# Patient Record
Sex: MALE | NOT HISPANIC OR LATINO | Employment: OTHER | URBAN - NONMETROPOLITAN AREA
[De-identification: names, ages, dates, MRNs, and addresses within clinical notes are randomized per-mention and may not be internally consistent; named-entity substitution may affect disease eponyms.]

---

## 2023-04-20 PROBLEM — M19.079 1ST MTP ARTHRITIS: Status: ACTIVE | Noted: 2023-04-20

## 2023-04-20 PROBLEM — E78.00 LOW-DENSITY-LIPOID-TYPE (LDL) HYPERLIPOPROTEINEMIA: Status: ACTIVE | Noted: 2023-04-20

## 2023-04-20 PROBLEM — G31.84 MCI (MILD COGNITIVE IMPAIRMENT): Status: ACTIVE | Noted: 2023-04-20

## 2023-04-20 PROBLEM — E66.9 OBESITY: Status: ACTIVE | Noted: 2023-04-20

## 2023-04-20 PROBLEM — M19.90 ARTHRITIS: Status: ACTIVE | Noted: 2023-04-20

## 2023-04-20 PROBLEM — N52.9 ERECTILE DYSFUNCTION: Status: ACTIVE | Noted: 2023-04-20

## 2023-04-20 PROBLEM — I10 HYPERTENSION: Status: ACTIVE | Noted: 2023-04-20

## 2023-04-20 PROBLEM — K42.9 UMBILICAL HERNIA: Status: ACTIVE | Noted: 2023-04-20

## 2023-04-20 PROBLEM — K58.9 IRRITABLE BOWEL SYNDROME: Status: ACTIVE | Noted: 2023-04-20

## 2023-04-20 PROBLEM — E78.3 HYPERCHYLOMICRONEMIA: Status: ACTIVE | Noted: 2023-04-20

## 2023-04-20 PROBLEM — E03.9 HYPOTHYROIDISM, ADULT: Status: ACTIVE | Noted: 2023-04-20

## 2023-04-20 PROBLEM — E79.0 HYPERURICEMIA: Status: ACTIVE | Noted: 2023-04-20

## 2023-04-20 PROBLEM — G47.33 OBSTRUCTIVE SLEEP APNEA, ADULT: Status: ACTIVE | Noted: 2023-04-20

## 2023-04-20 PROBLEM — E55.9 VITAMIN D DEFICIENCY: Status: ACTIVE | Noted: 2023-04-20

## 2023-04-20 RX ORDER — CLOBETASOL PROPIONATE 0.5 MG/G
CREAM TOPICAL 2 TIMES DAILY
COMMUNITY
Start: 2022-07-18 | End: 2023-11-16 | Stop reason: ALTCHOICE

## 2023-04-20 RX ORDER — LEVOTHYROXINE SODIUM 50 UG/1
50 TABLET ORAL DAILY
COMMUNITY
End: 2023-06-01 | Stop reason: SDUPTHER

## 2023-04-20 RX ORDER — SIMVASTATIN 20 MG/1
20 TABLET, FILM COATED ORAL EVERY EVENING
COMMUNITY
End: 2023-05-26 | Stop reason: SDUPTHER

## 2023-04-20 RX ORDER — ASPIRIN 81 MG/1
81 TABLET ORAL ONCE
COMMUNITY
Start: 2019-05-29

## 2023-04-20 RX ORDER — AMLODIPINE BESYLATE 10 MG/1
10 TABLET ORAL DAILY
COMMUNITY
End: 2023-06-01 | Stop reason: SDUPTHER

## 2023-04-20 RX ORDER — SILDENAFIL 100 MG/1
100 TABLET, FILM COATED ORAL AS NEEDED
COMMUNITY
End: 2023-10-24 | Stop reason: SDUPTHER

## 2023-04-24 ENCOUNTER — OFFICE VISIT (OUTPATIENT)
Dept: PRIMARY CARE | Facility: CLINIC | Age: 78
End: 2023-04-24
Payer: MEDICARE

## 2023-04-24 VITALS
HEART RATE: 58 BPM | HEIGHT: 68 IN | SYSTOLIC BLOOD PRESSURE: 146 MMHG | TEMPERATURE: 97.4 F | DIASTOLIC BLOOD PRESSURE: 68 MMHG | BODY MASS INDEX: 28.7 KG/M2 | WEIGHT: 189.4 LBS | OXYGEN SATURATION: 98 %

## 2023-04-24 DIAGNOSIS — Z79.899 HIGH RISK MEDICATION USE: ICD-10-CM

## 2023-04-24 DIAGNOSIS — E79.0 HYPERURICEMIA: ICD-10-CM

## 2023-04-24 DIAGNOSIS — Z13.89 SCREENING FOR MULTIPLE CONDITIONS: ICD-10-CM

## 2023-04-24 DIAGNOSIS — N18.31 STAGE 3A CHRONIC KIDNEY DISEASE (MULTI): ICD-10-CM

## 2023-04-24 DIAGNOSIS — Z00.00 ROUTINE GENERAL MEDICAL EXAMINATION AT HEALTH CARE FACILITY: ICD-10-CM

## 2023-04-24 DIAGNOSIS — I10 HYPERTENSION, UNSPECIFIED TYPE: ICD-10-CM

## 2023-04-24 DIAGNOSIS — M19.90 ARTHRITIS: ICD-10-CM

## 2023-04-24 DIAGNOSIS — Z13.6 SCREENING FOR CARDIOVASCULAR CONDITION: ICD-10-CM

## 2023-04-24 DIAGNOSIS — R53.83 OTHER FATIGUE: ICD-10-CM

## 2023-04-24 DIAGNOSIS — E78.3 HYPERCHYLOMICRONEMIA: ICD-10-CM

## 2023-04-24 DIAGNOSIS — E03.9 HYPOTHYROIDISM, ADULT: Primary | ICD-10-CM

## 2023-04-24 DIAGNOSIS — N52.9 ERECTILE DYSFUNCTION, UNSPECIFIED ERECTILE DYSFUNCTION TYPE: ICD-10-CM

## 2023-04-24 PROBLEM — E66.9 OBESITY: Status: RESOLVED | Noted: 2023-04-20 | Resolved: 2023-04-24

## 2023-04-24 PROBLEM — K42.9 UMBILICAL HERNIA: Status: RESOLVED | Noted: 2023-04-20 | Resolved: 2023-04-24

## 2023-04-24 PROBLEM — K58.9 IRRITABLE BOWEL SYNDROME: Status: RESOLVED | Noted: 2023-04-20 | Resolved: 2023-04-24

## 2023-04-24 PROCEDURE — 3078F DIAST BP <80 MM HG: CPT | Performed by: INTERNAL MEDICINE

## 2023-04-24 PROCEDURE — 3077F SYST BP >= 140 MM HG: CPT | Performed by: INTERNAL MEDICINE

## 2023-04-24 PROCEDURE — 1170F FXNL STATUS ASSESSED: CPT | Performed by: INTERNAL MEDICINE

## 2023-04-24 PROCEDURE — 1159F MED LIST DOCD IN RCRD: CPT | Performed by: INTERNAL MEDICINE

## 2023-04-24 PROCEDURE — 1160F RVW MEDS BY RX/DR IN RCRD: CPT | Performed by: INTERNAL MEDICINE

## 2023-04-24 PROCEDURE — 99214 OFFICE O/P EST MOD 30 MIN: CPT | Performed by: INTERNAL MEDICINE

## 2023-04-24 PROCEDURE — G0444 DEPRESSION SCREEN ANNUAL: HCPCS | Performed by: INTERNAL MEDICINE

## 2023-04-24 PROCEDURE — G0446 INTENS BEHAVE THER CARDIO DX: HCPCS | Performed by: INTERNAL MEDICINE

## 2023-04-24 PROCEDURE — 1036F TOBACCO NON-USER: CPT | Performed by: INTERNAL MEDICINE

## 2023-04-24 PROCEDURE — G0439 PPPS, SUBSEQ VISIT: HCPCS | Performed by: INTERNAL MEDICINE

## 2023-04-24 ASSESSMENT — ENCOUNTER SYMPTOMS
DEPRESSION: 0
DIARRHEA: 0
PALPITATIONS: 0
SINUS PAIN: 0
WHEEZING: 0
FATIGUE: 0
LOSS OF SENSATION IN FEET: 0
ABDOMINAL PAIN: 0
FEVER: 0
HYPERTENSION: 1
BRUISES/BLEEDS EASILY: 0
OCCASIONAL FEELINGS OF UNSTEADINESS: 0
UNEXPECTED WEIGHT CHANGE: 0
DIZZINESS: 0
ARTHRALGIAS: 0
BLOOD IN STOOL: 0
COUGH: 0
SORE THROAT: 0
HEADACHES: 0
DIFFICULTY URINATING: 0

## 2023-04-24 ASSESSMENT — ACTIVITIES OF DAILY LIVING (ADL)
TAKING_MEDICATION: INDEPENDENT
MANAGING_FINANCES: INDEPENDENT
GROCERY_SHOPPING: INDEPENDENT
DOING_HOUSEWORK: INDEPENDENT
BATHING: INDEPENDENT
DRESSING: INDEPENDENT

## 2023-04-24 ASSESSMENT — PATIENT HEALTH QUESTIONNAIRE - PHQ9
SUM OF ALL RESPONSES TO PHQ9 QUESTIONS 1 AND 2: 0
1. LITTLE INTEREST OR PLEASURE IN DOING THINGS: NOT AT ALL
2. FEELING DOWN, DEPRESSED OR HOPELESS: NOT AT ALL

## 2023-04-24 NOTE — PROGRESS NOTES
Subjective   Reason for Visit: Lonny Reece is an 77 y.o. male here for a Medicare Wellness visit.          Reviewed all medications by prescribing practitioner or clinical pharmacist (such as prescriptions, OTCs, herbal therapies and supplements) and documented in the medical record.    - Screening for depression  I spent 15 minutes obtaining and discussing depression screening using PHQ-2 questions with results documented in the chart.  -Need to complete blood work in May 2023 new order placed today  - Status post abdominal hernia repair doing well counseled about weight loss exercise  - Patient counseled about avoiding apple cider vinegar risk for osteoporosis and bleeding ulcer  - Screening for cardiovascular risk  I spent 15 minutes face-to-face with this individual discussing their cardiovascular risk and behavioral therapies of nutritional choices, exercise, and elimination of habits contributing to risk. We agreed on plan how they may be able to reduce their current cardiovascular risk.  Patient 10 year cardiac risk estimate calculates :   33.8    %   Counseled about low-carb diet for cardiovascular risk prevention maximize medical management  -Hypothyroid controlled  - Erectile dysfunction controlled continue with current medication  - Counseled about Shingrix vaccine new prescription provided today  - Chronic kidney disease avoid any NSAID maximize medical management reevaluate after blood work in 6 months      Vitamin Deficiency  Hypertension  Pertinent negatives include no chest pain, headaches or palpitations.       Patient Care Team:  Leann Dunaway MD as PCP - General  Leann Dunaway MD as PCP - [facility] ACO Attributed Provider     Review of Systems   Constitutional:  Negative for fatigue, fever and unexpected weight change.   HENT:  Negative for congestion, ear discharge, ear pain, mouth sores, sinus pain and sore throat.    Eyes:  Negative for visual disturbance.   Respiratory:  Negative for  "cough and wheezing.    Cardiovascular:  Negative for chest pain, palpitations and leg swelling.   Gastrointestinal:  Negative for abdominal pain, blood in stool and diarrhea.   Genitourinary:  Negative for difficulty urinating.   Musculoskeletal:  Negative for arthralgias.   Skin:  Negative for rash.   Neurological:  Negative for dizziness and headaches.   Hematological:  Does not bruise/bleed easily.   Psychiatric/Behavioral:  Negative for behavioral problems.    All other systems reviewed and are negative.      Objective   Vitals:  /68   Pulse 58   Temp 36.3 °C (97.4 °F)   Ht 1.727 m (5' 8\")   Wt 85.9 kg (189 lb 6.4 oz)   SpO2 98%   BMI 28.80 kg/m²     Lab Results   Component Value Date    WBC 9.0 05/16/2022    HGB 13.6 05/16/2022    HCT 40.3 (L) 05/16/2022     05/16/2022    CHOL 143 05/16/2022    TRIG 117 05/16/2022    HDL 48.0 05/16/2022    ALT 12 05/16/2022    AST 17 05/16/2022     05/16/2022    K 4.5 05/16/2022     05/16/2022    CREATININE 1.60 (H) 05/16/2022    BUN 30 (H) 05/16/2022    CO2 24 05/16/2022    TSH 1.42 05/16/2022     par   Physical Exam  Vitals and nursing note reviewed.   Constitutional:       Appearance: Normal appearance.   HENT:      Head: Normocephalic.      Nose: Nose normal.   Eyes:      Conjunctiva/sclera: Conjunctivae normal.      Pupils: Pupils are equal, round, and reactive to light.   Cardiovascular:      Rate and Rhythm: Regular rhythm.   Pulmonary:      Effort: Pulmonary effort is normal.      Breath sounds: Normal breath sounds.   Abdominal:      General: Abdomen is flat.      Palpations: Abdomen is soft.   Musculoskeletal:      Cervical back: Neck supple.   Skin:     General: Skin is warm.   Neurological:      General: No focal deficit present.      Mental Status: He is oriented to person, place, and time.   Psychiatric:         Mood and Affect: Mood normal.         Assessment/Plan   Problem List Items Addressed This Visit          Circulatory    " Hypertension    Relevant Orders    Comprehensive Metabolic Panel       Genitourinary    Erectile dysfunction       Musculoskeletal    Arthritis       Endocrine/Metabolic    Hypothyroidism, adult - Primary       Other    Hyperchylomicronemia    Hyperuricemia     Other Visit Diagnoses       Stage 3a chronic kidney disease        Screening for multiple conditions        Screening for cardiovascular condition        Relevant Orders    Lipid panel    Routine general medical examination at health care facility        Relevant Medications    zoster vaccine-recombinant adjuvanted (Shingrix) 50 mcg/0.5 mL vaccine    Other Relevant Orders    1 Year Follow Up In Primary Care - Wellness Exam    High risk medication use        Relevant Orders    CBC and Auto Differential    Other fatigue        Relevant Orders    TSH with reflex to Free T4 if abnormal          - Screening for depression  I spent 15 minutes obtaining and discussing depression screening using PHQ-2 questions with results documented in the chart.  -Need to complete blood work in May 2023 new order placed today  - Status post abdominal hernia repair doing well counseled about weight loss exercise  - Patient counseled about avoiding apple cider vinegar risk for osteoporosis and bleeding ulcer  - Screening for cardiovascular risk  I spent 15 minutes face-to-face with this individual discussing their cardiovascular risk and behavioral therapies of nutritional choices, exercise, and elimination of habits contributing to risk. We agreed on plan how they may be able to reduce their current cardiovascular risk.  Patient 10 year cardiac risk estimate calculates :   33.8    %   Counseled about low-carb diet for cardiovascular risk prevention maximize medical management  -Hypothyroid controlled  - Erectile dysfunction controlled continue with current medication  - Counseled about Shingrix vaccine new prescription provided today  - Chronic kidney disease avoid any NSAID  maximize medical management reevaluate after blood work in 6 months

## 2023-05-15 ENCOUNTER — LAB (OUTPATIENT)
Dept: LAB | Facility: LAB | Age: 78
End: 2023-05-15
Payer: MEDICARE

## 2023-05-15 DIAGNOSIS — I10 HYPERTENSION, UNSPECIFIED TYPE: ICD-10-CM

## 2023-05-15 DIAGNOSIS — R53.83 OTHER FATIGUE: ICD-10-CM

## 2023-05-15 DIAGNOSIS — Z79.899 HIGH RISK MEDICATION USE: ICD-10-CM

## 2023-05-15 DIAGNOSIS — Z13.6 SCREENING FOR CARDIOVASCULAR CONDITION: ICD-10-CM

## 2023-05-15 LAB
ALANINE AMINOTRANSFERASE (SGPT) (U/L) IN SER/PLAS: 15 U/L (ref 10–52)
ALBUMIN (G/DL) IN SER/PLAS: 4.4 G/DL (ref 3.4–5)
ALKALINE PHOSPHATASE (U/L) IN SER/PLAS: 69 U/L (ref 33–136)
ANION GAP IN SER/PLAS: 14 MMOL/L (ref 10–20)
ASPARTATE AMINOTRANSFERASE (SGOT) (U/L) IN SER/PLAS: 18 U/L (ref 9–39)
BASOPHILS (10*3/UL) IN BLOOD BY AUTOMATED COUNT: 0.06 X10E9/L (ref 0–0.1)
BASOPHILS/100 LEUKOCYTES IN BLOOD BY AUTOMATED COUNT: 0.9 % (ref 0–2)
BILIRUBIN TOTAL (MG/DL) IN SER/PLAS: 0.6 MG/DL (ref 0–1.2)
CALCIUM (MG/DL) IN SER/PLAS: 9.1 MG/DL (ref 8.6–10.3)
CARBON DIOXIDE, TOTAL (MMOL/L) IN SER/PLAS: 25 MMOL/L (ref 21–32)
CHLORIDE (MMOL/L) IN SER/PLAS: 108 MMOL/L (ref 98–107)
CHOLESTEROL (MG/DL) IN SER/PLAS: 144 MG/DL (ref 0–199)
CHOLESTEROL IN HDL (MG/DL) IN SER/PLAS: 48 MG/DL
CHOLESTEROL/HDL RATIO: 3
CREATININE (MG/DL) IN SER/PLAS: 1.37 MG/DL (ref 0.5–1.3)
EOSINOPHILS (10*3/UL) IN BLOOD BY AUTOMATED COUNT: 0.44 X10E9/L (ref 0–0.4)
EOSINOPHILS/100 LEUKOCYTES IN BLOOD BY AUTOMATED COUNT: 6.7 % (ref 0–6)
ERYTHROCYTE DISTRIBUTION WIDTH (RATIO) BY AUTOMATED COUNT: 12.4 % (ref 11.5–14.5)
ERYTHROCYTE MEAN CORPUSCULAR HEMOGLOBIN CONCENTRATION (G/DL) BY AUTOMATED: 32.7 G/DL (ref 32–36)
ERYTHROCYTE MEAN CORPUSCULAR VOLUME (FL) BY AUTOMATED COUNT: 95 FL (ref 80–100)
ERYTHROCYTES (10*6/UL) IN BLOOD BY AUTOMATED COUNT: 4.36 X10E12/L (ref 4.5–5.9)
GFR MALE: 53 ML/MIN/1.73M2
GLUCOSE (MG/DL) IN SER/PLAS: 88 MG/DL (ref 74–99)
HEMATOCRIT (%) IN BLOOD BY AUTOMATED COUNT: 41.3 % (ref 41–52)
HEMOGLOBIN (G/DL) IN BLOOD: 13.5 G/DL (ref 13.5–17.5)
IMMATURE GRANULOCYTES/100 LEUKOCYTES IN BLOOD BY AUTOMATED COUNT: 0.3 % (ref 0–0.9)
LDL: 71 MG/DL (ref 0–99)
LEUKOCYTES (10*3/UL) IN BLOOD BY AUTOMATED COUNT: 6.6 X10E9/L (ref 4.4–11.3)
LYMPHOCYTES (10*3/UL) IN BLOOD BY AUTOMATED COUNT: 1.8 X10E9/L (ref 0.8–3)
LYMPHOCYTES/100 LEUKOCYTES IN BLOOD BY AUTOMATED COUNT: 27.4 % (ref 13–44)
MONOCYTES (10*3/UL) IN BLOOD BY AUTOMATED COUNT: 0.42 X10E9/L (ref 0.05–0.8)
MONOCYTES/100 LEUKOCYTES IN BLOOD BY AUTOMATED COUNT: 6.4 % (ref 2–10)
NEUTROPHILS (10*3/UL) IN BLOOD BY AUTOMATED COUNT: 3.84 X10E9/L (ref 1.6–5.5)
NEUTROPHILS/100 LEUKOCYTES IN BLOOD BY AUTOMATED COUNT: 58.3 % (ref 40–80)
PLATELETS (10*3/UL) IN BLOOD AUTOMATED COUNT: 228 X10E9/L (ref 150–450)
POTASSIUM (MMOL/L) IN SER/PLAS: 4.2 MMOL/L (ref 3.5–5.3)
PROTEIN TOTAL: 6.7 G/DL (ref 6.4–8.2)
SODIUM (MMOL/L) IN SER/PLAS: 143 MMOL/L (ref 136–145)
THYROTROPIN (MIU/L) IN SER/PLAS BY DETECTION LIMIT <= 0.05 MIU/L: 1.6 MIU/L (ref 0.44–3.98)
TRIGLYCERIDE (MG/DL) IN SER/PLAS: 125 MG/DL (ref 0–149)
UREA NITROGEN (MG/DL) IN SER/PLAS: 27 MG/DL (ref 6–23)
VLDL: 25 MG/DL (ref 0–40)

## 2023-05-15 PROCEDURE — 84443 ASSAY THYROID STIM HORMONE: CPT

## 2023-05-15 PROCEDURE — 80061 LIPID PANEL: CPT

## 2023-05-15 PROCEDURE — 85025 COMPLETE CBC W/AUTO DIFF WBC: CPT

## 2023-05-15 PROCEDURE — 36415 COLL VENOUS BLD VENIPUNCTURE: CPT

## 2023-05-15 PROCEDURE — 80053 COMPREHEN METABOLIC PANEL: CPT

## 2023-05-26 DIAGNOSIS — I10 HYPERTENSION, UNSPECIFIED TYPE: ICD-10-CM

## 2023-05-26 DIAGNOSIS — E78.5 DYSLIPIDEMIA: ICD-10-CM

## 2023-05-26 RX ORDER — LOSARTAN POTASSIUM 100 MG/1
TABLET ORAL
Qty: 90 TABLET | Refills: 0 | Status: SHIPPED | OUTPATIENT
Start: 2023-05-26 | End: 2023-10-24 | Stop reason: SDUPTHER

## 2023-05-26 RX ORDER — LOSARTAN POTASSIUM 100 MG/1
100 TABLET ORAL DAILY
COMMUNITY
End: 2023-10-24 | Stop reason: WASHOUT

## 2023-05-26 RX ORDER — SIMVASTATIN 20 MG/1
TABLET, FILM COATED ORAL
Qty: 90 TABLET | Refills: 0 | Status: SHIPPED | OUTPATIENT
Start: 2023-05-26 | End: 2023-08-28

## 2023-06-01 DIAGNOSIS — I10 HYPERTENSION, UNSPECIFIED TYPE: ICD-10-CM

## 2023-06-01 DIAGNOSIS — E03.9 HYPOTHYROIDISM, ADULT: ICD-10-CM

## 2023-06-01 RX ORDER — AMLODIPINE BESYLATE 10 MG/1
10 TABLET ORAL DAILY
Qty: 90 TABLET | Refills: 0 | Status: SHIPPED | OUTPATIENT
Start: 2023-06-01 | End: 2023-06-05

## 2023-06-01 RX ORDER — LEVOTHYROXINE SODIUM 50 UG/1
50 TABLET ORAL DAILY
Qty: 90 TABLET | Refills: 0 | Status: SHIPPED | OUTPATIENT
Start: 2023-06-01 | End: 2023-08-30

## 2023-06-03 DIAGNOSIS — I10 HYPERTENSION, UNSPECIFIED TYPE: ICD-10-CM

## 2023-06-05 RX ORDER — AMLODIPINE BESYLATE 10 MG/1
TABLET ORAL
Qty: 90 TABLET | Refills: 0 | Status: SHIPPED | OUTPATIENT
Start: 2023-06-05 | End: 2023-09-04 | Stop reason: SDUPTHER

## 2023-08-03 ENCOUNTER — OFFICE VISIT (OUTPATIENT)
Dept: PRIMARY CARE | Facility: CLINIC | Age: 78
End: 2023-08-03
Payer: MEDICARE

## 2023-08-03 VITALS
OXYGEN SATURATION: 97 % | HEART RATE: 87 BPM | BODY MASS INDEX: 28.19 KG/M2 | HEIGHT: 68 IN | DIASTOLIC BLOOD PRESSURE: 60 MMHG | TEMPERATURE: 98.6 F | SYSTOLIC BLOOD PRESSURE: 114 MMHG | WEIGHT: 186 LBS

## 2023-08-03 DIAGNOSIS — I10 HYPERTENSION, UNSPECIFIED TYPE: Primary | ICD-10-CM

## 2023-08-03 DIAGNOSIS — Z01.818 PREOPERATIVE CLEARANCE: ICD-10-CM

## 2023-08-03 DIAGNOSIS — E03.9 HYPOTHYROIDISM, ADULT: ICD-10-CM

## 2023-08-03 DIAGNOSIS — E78.00 HYPERCHOLESTEROLEMIA: ICD-10-CM

## 2023-08-03 DIAGNOSIS — G47.33 OBSTRUCTIVE SLEEP APNEA, ADULT: ICD-10-CM

## 2023-08-03 PROBLEM — E78.3 HYPERCHYLOMICRONEMIA: Status: RESOLVED | Noted: 2023-04-20 | Resolved: 2023-08-03

## 2023-08-03 PROBLEM — E79.0 HYPERURICEMIA: Status: RESOLVED | Noted: 2023-04-20 | Resolved: 2023-08-03

## 2023-08-03 PROCEDURE — 3074F SYST BP LT 130 MM HG: CPT | Performed by: INTERNAL MEDICINE

## 2023-08-03 PROCEDURE — 93005 ELECTROCARDIOGRAM TRACING: CPT | Performed by: INTERNAL MEDICINE

## 2023-08-03 PROCEDURE — 1160F RVW MEDS BY RX/DR IN RCRD: CPT | Performed by: INTERNAL MEDICINE

## 2023-08-03 PROCEDURE — 3078F DIAST BP <80 MM HG: CPT | Performed by: INTERNAL MEDICINE

## 2023-08-03 PROCEDURE — 1036F TOBACCO NON-USER: CPT | Performed by: INTERNAL MEDICINE

## 2023-08-03 PROCEDURE — 1159F MED LIST DOCD IN RCRD: CPT | Performed by: INTERNAL MEDICINE

## 2023-08-03 PROCEDURE — 99214 OFFICE O/P EST MOD 30 MIN: CPT | Performed by: INTERNAL MEDICINE

## 2023-08-03 PROCEDURE — 93010 ELECTROCARDIOGRAM REPORT: CPT | Performed by: INTERNAL MEDICINE

## 2023-08-03 ASSESSMENT — ENCOUNTER SYMPTOMS
DIFFICULTY URINATING: 0
BLOOD IN STOOL: 0
BRUISES/BLEEDS EASILY: 0
PALPITATIONS: 0
HEADACHES: 0
COUGH: 0
ABDOMINAL PAIN: 0
WHEEZING: 0
SINUS PAIN: 0
FATIGUE: 0
DIZZINESS: 0
SORE THROAT: 0
ARTHRALGIAS: 0
DIARRHEA: 0
UNEXPECTED WEIGHT CHANGE: 0
FEVER: 0

## 2023-08-03 NOTE — PROGRESS NOTES
"Subjective   Patient ID: Lonny Reece is a 77 y.o. male who presents for Procedure (Sx med clearance. Dr weaver Eye surgery ).    Preoperative medical management asked by Dr. Weaver schedule surgery eye surgery August 18  Bilateral ectropion eyelid surgery  - EKG today sinus rhythm no ST-T wave changes  - Hypothyroid controlled  - Hypercholesteremia controlled continue with current medication  - Hypertension controlled continue current medication  -- Erectile dysfunction controlled continue with current medication  -- Chronic kidney disease avoid any NSAID maximize medical management  - Obstructive sleep apnea compensated  Patient said to hold aspirin 1 week prior to surgery  Patient medically cleared for surgery                Review of Systems   Constitutional:  Negative for fatigue, fever and unexpected weight change.   HENT:  Negative for congestion, ear discharge, ear pain, mouth sores, sinus pain and sore throat.    Eyes:  Negative for visual disturbance.   Respiratory:  Negative for cough and wheezing.    Cardiovascular:  Negative for chest pain, palpitations and leg swelling.   Gastrointestinal:  Negative for abdominal pain, blood in stool and diarrhea.   Genitourinary:  Negative for difficulty urinating.   Musculoskeletal:  Negative for arthralgias.   Skin:  Negative for rash.   Neurological:  Negative for dizziness and headaches.   Hematological:  Does not bruise/bleed easily.   Psychiatric/Behavioral:  Negative for behavioral problems.    All other systems reviewed and are negative.      Objective   No results found for: \"HGBA1C\"   /60   Pulse 87   Temp 37 °C (98.6 °F)   Ht 1.727 m (5' 8\")   Wt 84.4 kg (186 lb)   SpO2 97%   BMI 28.28 kg/m²     Physical Exam  Vitals and nursing note reviewed.   Constitutional:       Appearance: Normal appearance.   HENT:      Head: Normocephalic.      Nose: Nose normal.   Eyes:      Conjunctiva/sclera: Conjunctivae normal.      Pupils: Pupils are equal, " round, and reactive to light.   Cardiovascular:      Rate and Rhythm: Regular rhythm.   Pulmonary:      Effort: Pulmonary effort is normal.      Breath sounds: Normal breath sounds.   Abdominal:      General: Abdomen is flat.      Palpations: Abdomen is soft.   Musculoskeletal:      Cervical back: Neck supple.   Skin:     General: Skin is warm.   Neurological:      General: No focal deficit present.      Mental Status: He is oriented to person, place, and time.   Psychiatric:         Mood and Affect: Mood normal.         Assessment/Plan   Lonny was seen today for procedure.  Diagnoses and all orders for this visit:  Hypertension, unspecified type (Primary)  Obstructive sleep apnea, adult  Hypothyroidism, adult  Hypercholesterolemia   Preoperative medical management asked by Dr. Zhou schedule surgery eye surgery August 18  Bilateral ectropion eyelid surgery  - EKG today sinus rhythm no ST-T wave changes  - Hypothyroid controlled  - Hypercholesteremia controlled continue with current medication  - Hypertension controlled continue current medication  -- Erectile dysfunction controlled continue with current medication  -- Chronic kidney disease avoid any NSAID maximize medical management  - Obstructive sleep apnea compensated  Patient said to hold aspirin 1 week prior to surgery  Patient medically cleared for surgery

## 2023-08-27 DIAGNOSIS — I10 HYPERTENSION, UNSPECIFIED TYPE: ICD-10-CM

## 2023-08-27 DIAGNOSIS — E78.5 DYSLIPIDEMIA: ICD-10-CM

## 2023-08-28 RX ORDER — SIMVASTATIN 20 MG/1
TABLET, FILM COATED ORAL
Qty: 90 TABLET | Refills: 1 | Status: SHIPPED | OUTPATIENT
Start: 2023-08-28 | End: 2023-10-24 | Stop reason: SDUPTHER

## 2023-08-28 RX ORDER — LOSARTAN POTASSIUM 100 MG/1
100 TABLET ORAL DAILY
Qty: 90 TABLET | Refills: 1 | Status: SHIPPED | OUTPATIENT
Start: 2023-08-28 | End: 2023-10-24 | Stop reason: SDUPTHER

## 2023-08-29 DIAGNOSIS — E03.9 HYPOTHYROIDISM, ADULT: ICD-10-CM

## 2023-08-30 RX ORDER — LEVOTHYROXINE SODIUM 50 UG/1
50 TABLET ORAL DAILY
Qty: 90 TABLET | Refills: 1 | Status: SHIPPED | OUTPATIENT
Start: 2023-08-30 | End: 2023-10-24 | Stop reason: SDUPTHER

## 2023-09-02 DIAGNOSIS — I10 HYPERTENSION, UNSPECIFIED TYPE: ICD-10-CM

## 2023-09-04 PROBLEM — M19.141: Status: ACTIVE | Noted: 2023-09-04

## 2023-09-04 PROBLEM — M18.31: Status: ACTIVE | Noted: 2023-09-04

## 2023-09-04 RX ORDER — AMLODIPINE BESYLATE 10 MG/1
TABLET ORAL
Qty: 90 TABLET | Refills: 1 | Status: SHIPPED | OUTPATIENT
Start: 2023-09-04 | End: 2023-10-24 | Stop reason: SDUPTHER

## 2023-09-04 RX ORDER — NEOMYCIN SULFATE, POLYMYXIN B SULFATE AND DEXAMETHASONE 3.5; 10000; 1 MG/ML; [USP'U]/ML; MG/ML
1 SUSPENSION/ DROPS OPHTHALMIC 3 TIMES DAILY
COMMUNITY
Start: 2023-05-12 | End: 2023-10-24 | Stop reason: ALTCHOICE

## 2023-10-24 ENCOUNTER — OFFICE VISIT (OUTPATIENT)
Dept: PRIMARY CARE | Facility: CLINIC | Age: 78
End: 2023-10-24
Payer: MEDICARE

## 2023-10-24 VITALS
HEART RATE: 58 BPM | SYSTOLIC BLOOD PRESSURE: 158 MMHG | TEMPERATURE: 97.7 F | OXYGEN SATURATION: 98 % | BODY MASS INDEX: 28.86 KG/M2 | WEIGHT: 189.8 LBS | DIASTOLIC BLOOD PRESSURE: 78 MMHG

## 2023-10-24 DIAGNOSIS — E78.5 DYSLIPIDEMIA: ICD-10-CM

## 2023-10-24 DIAGNOSIS — B35.1 FUNGAL INFECTION OF TOENAIL: ICD-10-CM

## 2023-10-24 DIAGNOSIS — B35.1 FUNGAL INFECTION OF TOENAIL: Primary | ICD-10-CM

## 2023-10-24 DIAGNOSIS — N52.9 ERECTILE DYSFUNCTION, UNSPECIFIED ERECTILE DYSFUNCTION TYPE: ICD-10-CM

## 2023-10-24 DIAGNOSIS — E03.9 HYPOTHYROIDISM, ADULT: ICD-10-CM

## 2023-10-24 DIAGNOSIS — I10 HYPERTENSION, UNSPECIFIED TYPE: ICD-10-CM

## 2023-10-24 PROCEDURE — 3078F DIAST BP <80 MM HG: CPT | Performed by: INTERNAL MEDICINE

## 2023-10-24 PROCEDURE — 3077F SYST BP >= 140 MM HG: CPT | Performed by: INTERNAL MEDICINE

## 2023-10-24 PROCEDURE — 1160F RVW MEDS BY RX/DR IN RCRD: CPT | Performed by: INTERNAL MEDICINE

## 2023-10-24 PROCEDURE — 99214 OFFICE O/P EST MOD 30 MIN: CPT | Performed by: INTERNAL MEDICINE

## 2023-10-24 PROCEDURE — 1159F MED LIST DOCD IN RCRD: CPT | Performed by: INTERNAL MEDICINE

## 2023-10-24 PROCEDURE — 1036F TOBACCO NON-USER: CPT | Performed by: INTERNAL MEDICINE

## 2023-10-24 RX ORDER — LEVOTHYROXINE SODIUM 50 UG/1
50 TABLET ORAL DAILY
Qty: 90 TABLET | Refills: 1 | Status: SHIPPED | OUTPATIENT
Start: 2023-10-24 | End: 2024-04-25 | Stop reason: SDUPTHER

## 2023-10-24 RX ORDER — SIMVASTATIN 20 MG/1
20 TABLET, FILM COATED ORAL EVERY EVENING
Qty: 90 TABLET | Refills: 1 | Status: SHIPPED | OUTPATIENT
Start: 2023-10-24 | End: 2024-04-25 | Stop reason: SDUPTHER

## 2023-10-24 RX ORDER — CICLOPIROX 80 MG/ML
SOLUTION TOPICAL NIGHTLY
Qty: 6.6 ML | Refills: 3 | Status: SHIPPED | OUTPATIENT
Start: 2023-10-24 | End: 2023-11-16 | Stop reason: ALTCHOICE

## 2023-10-24 RX ORDER — ISOSORBIDE MONONITRATE 120 MG/1
120 TABLET, EXTENDED RELEASE ORAL DAILY
Qty: 30 TABLET | Refills: 11 | Status: SHIPPED | OUTPATIENT
Start: 2023-10-24 | End: 2023-11-16 | Stop reason: ALTCHOICE

## 2023-10-24 RX ORDER — TRIAMTERENE AND HYDROCHLOROTHIAZIDE 37.5; 25 MG/1; MG/1
1 CAPSULE ORAL EVERY MORNING
Qty: 90 CAPSULE | Refills: 1 | Status: SHIPPED | OUTPATIENT
Start: 2023-10-24 | End: 2023-11-21 | Stop reason: SDUPTHER

## 2023-10-24 RX ORDER — SILDENAFIL 100 MG/1
100 TABLET, FILM COATED ORAL AS NEEDED
Qty: 10 TABLET | Refills: 2 | Status: SHIPPED | OUTPATIENT
Start: 2023-10-24 | End: 2023-11-21 | Stop reason: SDUPTHER

## 2023-10-24 RX ORDER — LOSARTAN POTASSIUM 100 MG/1
100 TABLET ORAL DAILY
Qty: 90 TABLET | Refills: 1 | Status: SHIPPED | OUTPATIENT
Start: 2023-10-24 | End: 2024-04-25 | Stop reason: SDUPTHER

## 2023-10-24 RX ORDER — AMLODIPINE BESYLATE 10 MG/1
10 TABLET ORAL DAILY
Qty: 90 TABLET | Refills: 1 | Status: SHIPPED | OUTPATIENT
Start: 2023-10-24 | End: 2024-04-25 | Stop reason: SDUPTHER

## 2023-10-24 RX ORDER — TRIAMTERENE AND HYDROCHLOROTHIAZIDE 37.5; 25 MG/1; MG/1
1 CAPSULE ORAL EVERY MORNING
COMMUNITY
End: 2023-10-24 | Stop reason: SDUPTHER

## 2023-10-24 ASSESSMENT — ENCOUNTER SYMPTOMS
FEVER: 0
COLOR CHANGE: 1
COUGH: 0
SORE THROAT: 0
BLOOD IN STOOL: 0
HEADACHES: 0
ARTHRALGIAS: 0
DIZZINESS: 0
SINUS PAIN: 0
PALPITATIONS: 0
UNEXPECTED WEIGHT CHANGE: 0
BRUISES/BLEEDS EASILY: 0
HYPERTENSION: 1
DIFFICULTY URINATING: 0
ABDOMINAL PAIN: 0
WHEEZING: 0
FATIGUE: 0
DIARRHEA: 0

## 2023-10-24 NOTE — PROGRESS NOTES
Subjective   Patient ID: Lonny eRece is a 78 y.o. male who presents for Arthritis, Hypertension, Hypothyroidism, Flu Vaccine (declined), Med Refill, and Nail Problem (Bilateral great toenails, otc medications didn't work).    - Patient underwent uneventful surgery for ectropion of the eyelids doing much better  -Hypertension uncontrolled patient counseled about low-salt diet  Weight loss  Add new medication Imdur 120 mg follow-up results in 4 weeks  -Fungus toenails extensive patient counseled about prevention  Start on Penlac 8% apply daily for 6 months follow-up results closely  - Chronic renal insufficiency obtain BMP before next appointment for further recommendation  - Hypothyroid controlled  - Hypercholesteremia controlled continue with current medication  - Hypertension controlled continue current medication  -- Erectile dysfunction controlled continue with current medication  -- Chronic kidney disease avoid any NSAID maximize medical management  - Obstructive sleep apnea compensated  Follow-up results closely    Arthritis  Pertinent negatives include no diarrhea, fatigue, fever or rash.   Hypertension  Pertinent negatives include no chest pain, headaches or palpitations.   Med Refill  Pertinent negatives include no abdominal pain, arthralgias, chest pain, congestion, coughing, fatigue, fever, headaches, rash or sore throat.          Review of Systems   Constitutional:  Negative for fatigue, fever and unexpected weight change.   HENT:  Negative for congestion, ear discharge, ear pain, mouth sores, sinus pain and sore throat.    Eyes:  Negative for visual disturbance.   Respiratory:  Negative for cough and wheezing.    Cardiovascular:  Negative for chest pain, palpitations and leg swelling.   Gastrointestinal:  Negative for abdominal pain, blood in stool and diarrhea.   Genitourinary:  Negative for difficulty urinating.   Musculoskeletal:  Positive for arthritis. Negative for arthralgias.   Skin:  Positive  "for color change. Negative for rash.   Neurological:  Negative for dizziness and headaches.   Hematological:  Does not bruise/bleed easily.   Psychiatric/Behavioral:  Negative for behavioral problems.    All other systems reviewed and are negative.      Objective   No results found for: \"HGBA1C\"   /78   Pulse 58   Temp 36.5 °C (97.7 °F)   Wt 86.1 kg (189 lb 12.8 oz)   SpO2 98%   BMI 28.86 kg/m²   Lab Results   Component Value Date    WBC 6.6 05/15/2023    HGB 13.5 05/15/2023    HCT 41.3 05/15/2023     05/15/2023    CHOL 144 05/15/2023    TRIG 125 05/15/2023    HDL 48.0 05/15/2023    ALT 15 05/15/2023    AST 18 05/15/2023     05/15/2023    K 4.2 05/15/2023     (H) 05/15/2023    CREATININE 1.37 (H) 05/15/2023    BUN 27 (H) 05/15/2023    CO2 25 05/15/2023    TSH 1.60 05/15/2023     par   Physical Exam  Vitals and nursing note reviewed.   Constitutional:       Appearance: Normal appearance.   HENT:      Head: Normocephalic.      Nose: Nose normal.   Eyes:      Conjunctiva/sclera: Conjunctivae normal.      Pupils: Pupils are equal, round, and reactive to light.   Cardiovascular:      Rate and Rhythm: Regular rhythm.   Pulmonary:      Effort: Pulmonary effort is normal.      Breath sounds: Normal breath sounds.   Abdominal:      General: Abdomen is flat.      Palpations: Abdomen is soft.   Musculoskeletal:      Cervical back: Neck supple.   Skin:     General: Skin is warm.      Findings: Lesion (Fungus toenails) present.   Neurological:      General: No focal deficit present.      Mental Status: He is oriented to person, place, and time.   Psychiatric:         Mood and Affect: Mood normal.         Assessment/Plan   Lonny was seen today for arthritis, hypertension, hypothyroidism, flu vaccine, med refill and nail problem.  Diagnoses and all orders for this visit:  Fungal infection of toenail (Primary)  -     ciclopirox (Penlac) 8 % solution; Apply topically once daily at " bedtime.  Hypertension, unspecified type  -     amLODIPine (Norvasc) 10 mg tablet; Take 1 tablet (10 mg) by mouth once daily.  -     losartan (Cozaar) 100 mg tablet; Take 1 tablet (100 mg) by mouth once daily.  -     triamterene-hydrochlorothiazid (Dyazide) 37.5-25 mg capsule; Take 1 capsule by mouth once daily in the morning.  -     Basic metabolic panel; Future  -     isosorbide mononitrate ER (Imdur) 120 mg 24 hr tablet; Take 1 tablet (120 mg) by mouth once daily. Do not crush or chew.  Hypothyroidism, adult  -     levothyroxine (Synthroid, Levoxyl) 50 mcg tablet; Take 1 tablet (50 mcg) by mouth once daily.  Dyslipidemia  -     simvastatin (Zocor) 20 mg tablet; Take 1 tablet (20 mg) by mouth once daily in the evening.  Erectile dysfunction, unspecified erectile dysfunction type  -     sildenafil (Viagra) 100 mg tablet; Take 1 tablet (100 mg) by mouth if needed for erectile dysfunction.  Other orders  -     Follow Up In Primary Care - Established; Future   - Patient underwent uneventful surgery for ectropion of the eyelids doing much better  -Hypertension uncontrolled patient counseled about low-salt diet  Weight loss  Add new medication Imdur 120 mg follow-up results in 4 weeks  -Fungus toenails extensive patient counseled about prevention  Start on Penlac 8% apply daily for 6 months follow-up results closely  - Chronic renal insufficiency obtain BMP before next appointment for further recommendation  - Hypothyroid controlled  - Hypercholesteremia controlled continue with current medication  - Hypertension controlled continue current medication  -- Erectile dysfunction controlled continue with current medication  -- Chronic kidney disease avoid any NSAID maximize medical management  - Obstructive sleep apnea compensated  Follow-up results closely

## 2023-11-14 ENCOUNTER — OFFICE VISIT (OUTPATIENT)
Dept: PODIATRY | Facility: CLINIC | Age: 78
End: 2023-11-14
Payer: MEDICARE

## 2023-11-14 DIAGNOSIS — I73.9 PVD (PERIPHERAL VASCULAR DISEASE) (CMS-HCC): ICD-10-CM

## 2023-11-14 DIAGNOSIS — M79.672 PAIN IN BOTH FEET: Primary | ICD-10-CM

## 2023-11-14 DIAGNOSIS — M19.079 1ST MTP ARTHRITIS: ICD-10-CM

## 2023-11-14 DIAGNOSIS — M79.671 PAIN IN BOTH FEET: Primary | ICD-10-CM

## 2023-11-14 DIAGNOSIS — B35.1 ONYCHOMYCOSIS: ICD-10-CM

## 2023-11-14 PROCEDURE — 1159F MED LIST DOCD IN RCRD: CPT | Performed by: PODIATRIST

## 2023-11-14 PROCEDURE — 99202 OFFICE O/P NEW SF 15 MIN: CPT | Performed by: PODIATRIST

## 2023-11-14 PROCEDURE — 1160F RVW MEDS BY RX/DR IN RCRD: CPT | Performed by: PODIATRIST

## 2023-11-14 PROCEDURE — 1036F TOBACCO NON-USER: CPT | Performed by: PODIATRIST

## 2023-11-16 ENCOUNTER — LAB (OUTPATIENT)
Dept: LAB | Facility: LAB | Age: 78
End: 2023-11-16
Payer: MEDICARE

## 2023-11-16 ENCOUNTER — OFFICE VISIT (OUTPATIENT)
Dept: SLEEP MEDICINE | Facility: CLINIC | Age: 78
End: 2023-11-16
Payer: MEDICARE

## 2023-11-16 VITALS
HEART RATE: 56 BPM | DIASTOLIC BLOOD PRESSURE: 69 MMHG | OXYGEN SATURATION: 96 % | SYSTOLIC BLOOD PRESSURE: 151 MMHG | WEIGHT: 189.2 LBS | BODY MASS INDEX: 28.77 KG/M2

## 2023-11-16 DIAGNOSIS — I10 HYPERTENSION, UNSPECIFIED TYPE: ICD-10-CM

## 2023-11-16 DIAGNOSIS — G47.33 OBSTRUCTIVE SLEEP APNEA, ADULT: Primary | ICD-10-CM

## 2023-11-16 DIAGNOSIS — E66.3 OVERWEIGHT (BMI 25.0-29.9): ICD-10-CM

## 2023-11-16 LAB
ANION GAP SERPL CALC-SCNC: 14 MMOL/L (ref 10–20)
BUN SERPL-MCNC: 34 MG/DL (ref 6–23)
CALCIUM SERPL-MCNC: 9.5 MG/DL (ref 8.6–10.3)
CHLORIDE SERPL-SCNC: 106 MMOL/L (ref 98–107)
CO2 SERPL-SCNC: 25 MMOL/L (ref 21–32)
CREAT SERPL-MCNC: 1.78 MG/DL (ref 0.5–1.3)
GFR SERPL CREATININE-BSD FRML MDRD: 39 ML/MIN/1.73M*2
GLUCOSE SERPL-MCNC: 94 MG/DL (ref 74–99)
POTASSIUM SERPL-SCNC: 4.6 MMOL/L (ref 3.5–5.3)
SODIUM SERPL-SCNC: 140 MMOL/L (ref 136–145)

## 2023-11-16 PROCEDURE — 1159F MED LIST DOCD IN RCRD: CPT

## 2023-11-16 PROCEDURE — 1036F TOBACCO NON-USER: CPT

## 2023-11-16 PROCEDURE — 3078F DIAST BP <80 MM HG: CPT

## 2023-11-16 PROCEDURE — 80048 BASIC METABOLIC PNL TOTAL CA: CPT

## 2023-11-16 PROCEDURE — 3077F SYST BP >= 140 MM HG: CPT

## 2023-11-16 PROCEDURE — 99213 OFFICE O/P EST LOW 20 MIN: CPT

## 2023-11-16 PROCEDURE — 36415 COLL VENOUS BLD VENIPUNCTURE: CPT

## 2023-11-16 PROCEDURE — 1160F RVW MEDS BY RX/DR IN RCRD: CPT

## 2023-11-16 ASSESSMENT — ANXIETY QUESTIONNAIRES
2. NOT BEING ABLE TO STOP OR CONTROL WORRYING: SEVERAL DAYS
3. WORRYING TOO MUCH ABOUT DIFFERENT THINGS: SEVERAL DAYS
6. BECOMING EASILY ANNOYED OR IRRITABLE: SEVERAL DAYS
1. FEELING NERVOUS, ANXIOUS, OR ON EDGE: NOT AT ALL
4. TROUBLE RELAXING: NOT AT ALL
7. FEELING AFRAID AS IF SOMETHING AWFUL MIGHT HAPPEN: SEVERAL DAYS
5. BEING SO RESTLESS THAT IT IS HARD TO SIT STILL: NOT AT ALL
GAD7 TOTAL SCORE: 4

## 2023-11-16 ASSESSMENT — SLEEP AND FATIGUE QUESTIONNAIRES
HOW LIKELY ARE YOU TO NOD OFF OR FALL ASLEEP WHEN YOU ARE A PASSENGER IN A CAR FOR AN HOUR WITHOUT A BREAK: WOULD NEVER DOZE
HOW LIKELY ARE YOU TO NOD OFF OR FALL ASLEEP WHILE LYING DOWN TO REST IN THE AFTERNOON WHEN CIRCUMSTANCES PERMIT: WOULD NEVER DOZE
ESS-CHAD TOTAL SCORE: 3
SITING INACTIVE IN A PUBLIC PLACE LIKE A CLASS ROOM OR A MOVIE THEATER: WOULD NEVER DOZE
HOW LIKELY ARE YOU TO NOD OFF OR FALL ASLEEP IN A CAR, WHILE STOPPED FOR A FEW MINUTES IN TRAFFIC: WOULD NEVER DOZE
WORRIED_DISTRESSED_DUE_TO_SLEEP: NOT AT ALL NOTICEABLE
HOW LIKELY ARE YOU TO NOD OFF OR FALL ASLEEP WHILE SITTING AND READING: MODERATE CHANCE OF DOZING
HOW LIKELY ARE YOU TO NOD OFF OR FALL ASLEEP WHILE WATCHING TV: SLIGHT CHANCE OF DOZING
HOW LIKELY ARE YOU TO NOD OFF OR FALL ASLEEP WHILE SITTING AND TALKING TO SOMEONE: WOULD NEVER DOZE
SLEEP_PROBLEM_INTERFERES_DAILY_ACTIVITIES: NOT AT ALL NOTICEABLE
HOW LIKELY ARE YOU TO NOD OFF OR FALL ASLEEP WHILE SITTING QUIETLY AFTER LUNCH WITHOUT ALCOHOL: WOULD NEVER DOZE
SLEEP_PROBLEM_NOTICEABLE_TO_OTHERS: NOT AT ALL NOTICEABLE
SATISFACTION_WITH_CURRENT_SLEEP_PATTERN: SATISFIED

## 2023-11-16 ASSESSMENT — PATIENT HEALTH QUESTIONNAIRE - PHQ9
2. FEELING DOWN, DEPRESSED OR HOPELESS: NOT AT ALL
1. LITTLE INTEREST OR PLEASURE IN DOING THINGS: NOT AT ALL
SUM OF ALL RESPONSES TO PHQ9 QUESTIONS 1 AND 2: 0

## 2023-11-16 NOTE — LETTER
Frequency of replacement of CPAP / BIPAP supplies as per Medicare guidelines  (This frequency of replacement can be different with private insurances or Medicaid)    Nasal mask, full face mask, tubing, heated tubing - 1 per 3 months  Headgear, water chamber, chin strap, reusable filter - 1 per 6 months  Disposable filter, replacement cushion, nasal pillows - 2 per month  Replacement cushion / liner for interface - 1 per month    CPAP / BIPAP Cleaning Recommendations    There are several specialized CPAP cleaning machines on the market. None of them are as good as soap and water. The only thing that you need to clean daily is the part of the mask that contacts your skin also known as the mask insert. This mask insert needs to be cleaned with soap and water daily. Another option is to use baby wipes daily.     The rest of the mask, the water chamber, and the tubing should be cleaned at least once a week.    The water tank needs to be filled with distilled water to prevent buildup of white deposits in the future. If you cannot find distilled water, you can use tap water but expect to have white deposits buildup seen after prolonged use with tap water. If you start seeing white deposits on the water tank, you can clean it by filling it with equal parts of distilled white vinegar and water. Let the vinegar-water mixture sit for 2 hours, and then rinse it with running tap water. Clean with soap and water then let it dry.

## 2023-11-16 NOTE — PATIENT INSTRUCTIONS
It was a pleasure meeting you today Lonny Reece     As we discussed today in clinic:    1. Continue with your current CPAP setting.   2. Encouraged to lose weight.   3. Remember, don't drive when sleepy.   4. I submitted for updated yearly supply order for [facility] including setting you up for auto-resupplies  5. Your BP was elevated today in clinic - monitor your BP, if continually elevated or your experience any lightheaded, dizziness, CP, headache or concerning symptoms notify your PCP or go to the nearest ER for evaluation       As a general guideline, please replace your: PAP cushions every 2-4 weeks, mask every 3-6 months, hose every 3-6 months, Filter (disposable) every 2-4 weeks; machine may need replacement in 5-10 years (once they stop working).     Education handouts given: PAP Handouts / Cleaning Schedule    Follow-up: 1 year or sooner if needed    ALWAYS BRING YOUR CPAP / BIPAP WITH YOU TO EVERY APPOINTMENT!  THANKS    FOR QUESTIONS AND CONCERNS:   1. In case of problems with machine or mask interface, please contact your DME company first. DME is the company that provides you the machine and/or CPAP supplies. If [employer] if your DME, you can reach them at [phone] or [employer]) [phone].  2. For SLEEP STUDY appointments, please call [phone] ([facility]) or [phone] / [phone] ([facility]) or any other  Sleep Lab location please call [phone]  3. For MEDICAL QUESTIONS, MEDICATION REFILLS, or CLINIC APPOINTMENT SCHEDULING, please call [phone] and my practice lead Gloria would gladly assist you with any concerns.   4. In the event that you are running more than 10 minutes late to your appointment or 5 minutes to virtual, I will kindly ask you to reschedule.    Here at [facility], we wish you a restful sleep!

## 2023-11-16 NOTE — PROGRESS NOTES
Patient: Lonny Corral  : 1945 AGE: 78 y.o. SEX:male   MRN: [id number]   Provider: [facility]     Location [facility]   Service Date: 2023     PCP: Leann Dunaway MD   Referred by: No ref. provider found              [facility]  FOLLOW-UP VISIT NOTE      HISTORY OF PRESENT ILLNESS     Patient ID: Lonny Corral is a 78 y.o. male who presents to a [facility] for follow-up of FLORENTINO on PAP, yearly checkup.     Patient is here accompanied by wife who adds to history.  The patient has pertinent hx of FLORENTINO, obesity, HTN, hypothyroidism, IBS, MCI, and vit D deficiency.     Previous Visit's:  2022  Mr. LONNY CORRAL is a 77 year male who presents today for FLORENTINO on CPAP, here for initial compliance check for new machine. Patient has a pertinent hx of FLORENTINO, obesity, HTN, hypothyroidism, IBS, MCI, and vit D deficiency.    Patient here today for initial compliance check with new CPAP with his wife. He does not report any issues with new machine. However, the DME company sent a new mask and he does not like the new mask (resmed F20) as it cuts into the bridge of his nose. He switched back to his previous mask but does need a new mask. Large Resmed Airfit F10 was given in clinic to bridge patient till next supply order.      Patient is using machine every night, no issues with machine. He is tolerating pressures well. He has minimal positional mask leak. Patient believes they are getting adequate sleep. He does not think he snores on the machine. He wakes up refreshed in the morning and denies any excessive daytime sleepiness. Denies nasal congestion, dry mouth, skin irritation, aerophagia, and air hunger.    2022  Referred by PCP. Here to get established. Used to see Dr. Rocha. Diagnosed with FLORENTINO by PSG in , currently on auto-CPAP 12 - 18 cmH20 EPR off  Patient present today with his wife for  f/u FLORENTINO on CPAP. He received a letter from DME company telling him he needs to see a provider and get a new machine d/t age of machine. He reports that he wears his CPAP nightly, denies any issues with pressure, mask or machine. He reports good control of symptoms as well as the wife.        Interval History  Patient was last seen in 11/16/2022.   11/16/23   Since last visit, patient has been using machine every night with clinical benefit and no issues on machine. Tolerating pressure, mask, and humidity. Current mask interface being used is FFM mask. Per records, patient is getting supplies thru [employer]  Patient denies mask leak, air hunger, aerophagia, dry mouth, skin irritation, and snoring on PAP.  The following are patient's perceived benefits of PAP: decreased or no snoring, refreshing sleep, decreased daytime sleepiness and/or fatigue, decreased nocturnal awakening, decreased nocturia, sleeps faster at bedtime, sleeps longer, better quality of sleep, and improved memory, concentration, and/or mood.  He does though report that [employer] has not sent him any supplies since the previous visit. He has called several times and they state he does not have an account, yet he is paying on his current new CPAP machine. I will submit an updated order to [employer] today as well as have them establish him with auto-resupply      PAP adherence data reviewed personally today in clinic. (see scanned document in Epic)  Source of data: website download  DME: [employer]  Mask: Resmed F10  Machine: Resmed 11  Setup date: 2022  Settings:  12 - 12 cm H2O and EPR 3  Date Range: 10/15 to 11/13/23  Days used: 30/30 days  >4 hrs usage: 100%  >4 hrs days: 30/30 days  Average usage hours (days used): 8 hrs 16 mins  Average usage hours (all days or total days): 8 hrs 16 mins  Pressure: Median 12.5, 95th percentile 15.6, Maximum 17.5  Leak: 95th percentile 7.1, maximum 15.7  AHI: 1.3 (RERA index 0.5, LEENA 0.2)      LIST OF  PAP-RELATED ISSUES:  Issues with machine: No   Rain-out: No   Air hunger at the start of usage: No   Air hunger in the middle of sleep: No   Sensation of too much pressure: No   Bloating, chest pain, or aerophagia on PAP: No   Nasal or sinus congestion: No   Dry mouth and/or dry nose: No   Skin irritation from mask: No   Mask intolerance: No   Taking off mask without recall: No   Snoring on PAP: No   OTHER: needs new supplies    SLEEP STUDY HISTORY  PSG 8/10/2015  BMI - 27.83  AHI - 32.2/hr  REM AHI - 36.4/hr  Supine AHI - 49.5/hr  SpO2 wes - 83%    SLEEP-WAKE SCHEDULE  Bedtime:  12 - 1 am daily  Subjective sleep latency: less than 5 mins  Difficulty falling asleep: No   Number of awakenings: x1 times per night   Falls back asleep in less than 5 mins  Difficulty staying asleep: No  due to nocturia and unknown reasons  Final wake time:  8 am daily  Out of bed time:  8 am daily  Shift work: retired  Naps: daily for 1 hr  Feels rested after a nap: Yes   Average sleep duration (excluding naps): ~ 8 hrs    SLEEP ENVIRONMENT  Sleep location: bed  Sleep status: sleeps with wife  Preferred sleep position: back  TV in bedroom:   Room is dark:  Yes  Room is quiet: Yes  Room is cool: Yes  Bed comfort: good    SLEEP HABITS   Activities before bedtime:   Activities in bed:   Clockwatching: No   Smoking: never  ETOH: rarely  Marijuana: denied  Caffeine: daily  Sleep aids: denied    WEIGHT: stable    Claustrophobia: No     REVIEW OF SYSTEMS     Review of Systems  SLEEP ROS: decreased memory, take naps during the day    All other systems have been reviewed and are negative.      ALLERGIES     No Known Allergies    MEDICATIONS     Current Outpatient Medications   Medication Sig Dispense Refill    amLODIPine (Norvasc) 10 mg tablet Take 1 tablet (10 mg) by mouth once daily. 90 tablet 1    aspirin 81 mg EC tablet Take 1 tablet (81 mg) by mouth 1 time.      cholecalciferol, vitamin D3, (VITAMIN D3 ORAL) Vitamin D3      levothyroxine  (Synthroid, Levoxyl) 50 mcg tablet Take 1 tablet (50 mcg) by mouth once daily. 90 tablet 1    losartan (Cozaar) 100 mg tablet Take 1 tablet (100 mg) by mouth once daily. 90 tablet 1    multivitamin with minerals (multivitamin-iron-folic acid) tablet Take 1 tablet by mouth once daily.      NON FORMULARY prevagen      sildenafil (Viagra) 100 mg tablet Take 1 tablet (100 mg) by mouth if needed for erectile dysfunction. 10 tablet 2    simvastatin (Zocor) 20 mg tablet Take 1 tablet (20 mg) by mouth once daily in the evening. 90 tablet 1    triamterene-hydrochlorothiazid (Dyazide) 37.5-25 mg capsule Take 1 capsule by mouth once daily in the morning. 90 capsule 1     No current facility-administered medications for this visit.       PAST HISTORY     PERTINENT PAST MEDICAL HISTORY: See HPI    PERTINENT PAST SURGICAL HISTORY for Sleep Medicine:  tonsillectomy    PERTINENT FAMILY HISTORY for Sleep Medicine:  Patient denies family history of any sleep disorder.  Patient denies family history of sleep apnea.    PERTINENT SOCIAL HISTORY:  He  reports that he has never smoked. He has never used smokeless tobacco. He reports that he does not drink alcohol and does not use drugs. He currently lives with a partner or spouse and retired from work.     Active Problems, Allergy List, Medication List, and PMH/PSH/FH/Social Hx have been reviewed and reconciled in chart. No significant changes unless documented in the pertinent chart section. Updates made when necessary.     PHYSICAL EXAM     VITAL SIGNS: /69   Pulse 56   Wt 85.8 kg (189 lb 3.2 oz)   SpO2 96%   BMI 28.77 kg/m²     NECK CIRCUMFERENCE:     CURRENT WEIGHT:   Vitals:    23 1329   Weight: 85.8 kg (189 lb 3.2 oz)      BMI: Body mass index is 28.77 kg/m².     PREVIOUS WEIGHTS:  Wt Readings from Last 3 Encounters:   23 85.8 kg (189 lb 3.2 oz)   10/24/23 86.1 kg (189 lb 12.8 oz)   23 84.4 kg (186 lb)       STOP-BAN    Today ESS: 3  Last visit ESS:  "2    Today RIVKA: 1  Last visit RIVKA: 0    Today PHQ-2: NEGATIVE SCREEN     Today MICHAEL-7: 4      Physical Exam  Constitutional: Awake, not in distress  Lungs: Clear to auscultation bilateral, no cough noted  Heart: Regular rate and rhythm  Skin: Warm, no visible rashes  Neuro: No tremors, moves all extremities  Psych: Alert and oriented to time, place, and person    ENT: Modified Mallampati score - III          RESULTS/DATA     No results found for: \"IRON\", \"TRANSFERRIN\", \"IRONSAT\", \"TIBC\", \"FERRITIN\"    Bicarbonate   Date Value Ref Range Status   05/15/2023 25 21 - 32 mmol/L Final       ASSESSMENT/PLAN     Assessment/Plan   Lonny Reece is a 78 y.o. male presents today in [facility] with the following problems:    CURRENT DME: [facility]     OBSTRUCTIVE SLEEP APNEA, severe (PSG AHI: 32.2/hr)  s/p tonsillectomy  currently on auto-CPAP 12 - 20 cmH20 EPR 3  Excellent compliance to PAP therapy, residual AHI at goal, and good control of FLORENTINO symptoms   - Patient's risk factors for FLORENTINO: Age, neck circumference, gender, HTN, and narrow crowded upper airway anatomy  - FLORENTINO diagnosed by PSG in 2015. Reviewed sleep studies previously in clinic. See HPI.  - Retrieved and personally reviewed recent PAP adherence download data today. See HPI.  - Continue current PAP settings.   - patient needs new mask - currently utilizing Resmed Airfit F10  - he has not received any new supplies after the initial resupply order. He reports that [facility] is stating he does not have an account, yet every month he is still paying on his CPAP machine  - I will submit an updated order for CPAP supplies including getting patient established with auto-resupplies    OVERWEIGHT  - BMI today Body mass index is 28.77 kg/m².   - most recent Bicarb WNL, low suspicion for obesity hypoventilation syndrome  - no significant weight changes noted or reported  - Encouraged patient to lose weight with diet and exercise.   - Weight loss can help " in the long term treatment of FLORENTINO.      HYPERTENSION  - BP today 151/69  - denies any headache, blurry vision, chest pain, palpitation, dizziness, lightheadedness, or syncopal episodes  - encouraged daily exercise with healthy diet for BP and FLORENTINO management  - encouraged close follow-up with PCP for better management of BP   - Defer management to PCP    All of patient's questions were answered. He verbalizes understanding and agreement with my assessment and plan.

## 2023-11-19 NOTE — PROGRESS NOTES
This is a 78 y.o. male new patient for foot pain    History of Present Illness:   Patient states they are here for foot exam   Concerns of thick nails  Unable to trimon own  No other pedal complaints  No trauma noted.     Past Medical History  Past Medical History:   Diagnosis Date    Allergic contact dermatitis due to plants, except food 12/02/2020    Poison ivy    Allergic contact dermatitis due to plants, except food 10/12/2015    Contact dermatitis due to poison ivy    Body mass index (BMI) 27.0-27.9, adult 06/02/2020    BMI 27.0-27.9,adult    Body mass index (BMI) 29.0-29.9, adult 09/15/2021    Body mass index (BMI) of 29.0 to 29.9 in adult    Encounter for screening for malignant neoplasm of prostate 12/22/2014    Encounter for prostate cancer screening    Other amnesia 12/19/2013    Memory loss    Personal history of diseases of the skin and subcutaneous tissue     History of psoriasis    Personal history of other (healed) physical injury and trauma 03/31/2016    History of insect bite    Personal history of other diseases of male genital organs 11/21/2017    History of erectile dysfunction    Personal history of other diseases of the digestive system 05/29/2019    History of umbilical hernia    Personal history of other diseases of the musculoskeletal system and connective tissue 04/16/2014    History of low back pain    Personal history of other diseases of the nervous system and sense organs 02/06/2014    History of sciatica    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    Review Of Systems:  GENERAL: No weight loss, malaise or fevers.  HEENT: Negative for frequent or significant headaches,   RESPIRATORY: Negative for cough, wheezing or shortness of  breath.  CARDIOVASCULAR: Negative for chest pain, leg swelling or palpitations.    Physical Exam:  Patient is a pleasant, cooperative, well developed 78 y.o.  adult male. The patient is alert and oriented to time, place and person.   Patient has normal affect and mood.    Examination of Both Lower Extremities:   Objective:   Vasc: DP and PT pulses are palpable bilateral.  CFT is less than 3 seconds bilateral.  Skin temperature is warm to cool proximal to distal bilateral.  No hair growth noted. Varicosities noted    Neuro: Vibratory, light touch and proprioception are intact bilateral.      Derm: Nails 1-5 bilateral are intact thick and discolored.   Skin is supple with normal texture and turgor noted.  Webspaces are clean, dry and intact bilateral.  There are no hyperkeratoses, ulcerations, verruca or other lesions noted.      Ortho: Muscle strength is 5/5 for all pedal groups tested.  1st MTPJ with dc Rom. No edema, erythema or ecchymosis noted.     A comprehensive history and physical exam was performed. The patient was educated on clinical and radiographic findings, diagnosis, and treatment plans.    1. Pain in both feet        2. Onychomycosis        3. PVD (peripheral vascular disease) (CMS/[facility])            Patient exam and eval  Debrided thick nails  Ok to keep trim and filed down  Discussed stiff shoes  Fu prn  Patient was in agreement to this plan. All questions answered.      Malaika Hammonds DPM  [phone]  Option 2  Fax: [phone]

## 2023-11-21 ENCOUNTER — OFFICE VISIT (OUTPATIENT)
Dept: PRIMARY CARE | Facility: CLINIC | Age: 78
End: 2023-11-21
Payer: MEDICARE

## 2023-11-21 VITALS
BODY MASS INDEX: 28.77 KG/M2 | DIASTOLIC BLOOD PRESSURE: 74 MMHG | WEIGHT: 189.2 LBS | OXYGEN SATURATION: 97 % | SYSTOLIC BLOOD PRESSURE: 126 MMHG | TEMPERATURE: 97.7 F | HEART RATE: 63 BPM

## 2023-11-21 DIAGNOSIS — M19.90 ARTHRITIS: ICD-10-CM

## 2023-11-21 DIAGNOSIS — N52.9 ERECTILE DYSFUNCTION, UNSPECIFIED ERECTILE DYSFUNCTION TYPE: ICD-10-CM

## 2023-11-21 DIAGNOSIS — N18.31 STAGE 3A CHRONIC KIDNEY DISEASE (MULTI): Primary | ICD-10-CM

## 2023-11-21 DIAGNOSIS — I10 HYPERTENSION, UNSPECIFIED TYPE: ICD-10-CM

## 2023-11-21 DIAGNOSIS — E03.9 HYPOTHYROIDISM, ADULT: ICD-10-CM

## 2023-11-21 DIAGNOSIS — G47.33 OBSTRUCTIVE SLEEP APNEA, ADULT: ICD-10-CM

## 2023-11-21 DIAGNOSIS — E78.3 HYPERCHYLOMICRONEMIA: ICD-10-CM

## 2023-11-21 DIAGNOSIS — E79.0 HYPERURICEMIA: ICD-10-CM

## 2023-11-21 PROCEDURE — 3074F SYST BP LT 130 MM HG: CPT | Performed by: INTERNAL MEDICINE

## 2023-11-21 PROCEDURE — 1159F MED LIST DOCD IN RCRD: CPT | Performed by: INTERNAL MEDICINE

## 2023-11-21 PROCEDURE — 3078F DIAST BP <80 MM HG: CPT | Performed by: INTERNAL MEDICINE

## 2023-11-21 PROCEDURE — 1036F TOBACCO NON-USER: CPT | Performed by: INTERNAL MEDICINE

## 2023-11-21 PROCEDURE — 99214 OFFICE O/P EST MOD 30 MIN: CPT | Performed by: INTERNAL MEDICINE

## 2023-11-21 PROCEDURE — 1160F RVW MEDS BY RX/DR IN RCRD: CPT | Performed by: INTERNAL MEDICINE

## 2023-11-21 RX ORDER — TRIAMTERENE AND HYDROCHLOROTHIAZIDE 37.5; 25 MG/1; MG/1
1 CAPSULE ORAL EVERY OTHER DAY
Qty: 90 CAPSULE | Refills: 1 | COMMUNITY
Start: 2023-11-21 | End: 2023-11-28

## 2023-11-21 RX ORDER — SILDENAFIL 100 MG/1
100 TABLET, FILM COATED ORAL AS NEEDED
Qty: 30 TABLET | Refills: 1 | Status: SHIPPED | OUTPATIENT
Start: 2023-11-21 | End: 2024-04-25 | Stop reason: SDUPTHER

## 2023-11-21 ASSESSMENT — ENCOUNTER SYMPTOMS
FATIGUE: 0
ABDOMINAL PAIN: 0
UNEXPECTED WEIGHT CHANGE: 0
BLOOD IN STOOL: 0
PALPITATIONS: 0
DIFFICULTY URINATING: 0
DIARRHEA: 0
FEVER: 0
SINUS PAIN: 0
HYPERTENSION: 1
HEADACHES: 0
ARTHRALGIAS: 0
COUGH: 0
DIZZINESS: 0
WHEEZING: 0
SORE THROAT: 0
BRUISES/BLEEDS EASILY: 0

## 2023-11-21 NOTE — PROGRESS NOTES
Subjective   Patient ID: Lonny Reece is a 78 y.o. male who presents for Hypertension (Started imdur) and Erectile Dysfunction (Requests #30 or the viagra instead of the #10).    - Hypertension improved continue with Imdur 120 no complaints  -Renal function worsening creatinine now 1.7  Need to change Dyazide to take it every other day  -Erectile dysfunction May use sildenafil as recommended new prescription provided today 30 tablets for 90-day supply  - Patient underwent uneventful surgery for ectropion of the eyelids doing much better awaiting another surgery in April 2024  -Fungus toenails extensive patient counseled about prevention  Start on Penlac 8% apply daily for 6 months follow-up results closely  - Chronic renal insufficiency obtain BMP before next appointment for further recommendation  - Hypothyroid controlled  - Hypercholesteremia controlled continue with current medication  - Hypertension controlled continue current medication  -- Erectile dysfunction controlled continue with current medication  - Obstructive sleep apnea compensated  Follow-up in April 2024 for Medicare physical         Hypertension  Pertinent negatives include no chest pain, headaches or palpitations.   Erectile Dysfunction           Review of Systems   Constitutional:  Negative for fatigue, fever and unexpected weight change.   HENT:  Negative for congestion, ear discharge, ear pain, mouth sores, sinus pain and sore throat.    Eyes:  Negative for visual disturbance.   Respiratory:  Negative for cough and wheezing.    Cardiovascular:  Negative for chest pain, palpitations and leg swelling.   Gastrointestinal:  Negative for abdominal pain, blood in stool and diarrhea.   Genitourinary:  Negative for difficulty urinating.   Musculoskeletal:  Negative for arthralgias.   Skin:  Negative for rash.   Neurological:  Negative for dizziness and headaches.   Hematological:  Does not bruise/bleed easily.   Psychiatric/Behavioral:  Negative  "for behavioral problems.    All other systems reviewed and are negative.      Objective   No results found for: \"HGBA1C\"   /74   Pulse 63   Temp 36.5 °C (97.7 °F)   Wt 85.8 kg (189 lb 3.2 oz)   SpO2 97%   BMI 28.77 kg/m²   Lab Results   Component Value Date    WBC 6.6 05/15/2023    HGB 13.5 05/15/2023    HCT 41.3 05/15/2023     05/15/2023    CHOL 144 05/15/2023    TRIG 125 05/15/2023    HDL 48.0 05/15/2023    ALT 15 05/15/2023    AST 18 05/15/2023     11/16/2023    K 4.6 11/16/2023     11/16/2023    CREATININE 1.78 (H) 11/16/2023    BUN 34 (H) 11/16/2023    CO2 25 11/16/2023    TSH 1.60 05/15/2023     par   Physical Exam  Vitals and nursing note reviewed.   Constitutional:       Appearance: Normal appearance.   HENT:      Head: Normocephalic.      Nose: Nose normal.   Eyes:      Conjunctiva/sclera: Conjunctivae normal.      Pupils: Pupils are equal, round, and reactive to light.   Cardiovascular:      Rate and Rhythm: Regular rhythm.   Pulmonary:      Effort: Pulmonary effort is normal.      Breath sounds: Normal breath sounds.   Abdominal:      General: Abdomen is flat.      Palpations: Abdomen is soft.   Musculoskeletal:      Cervical back: Neck supple.   Skin:     General: Skin is warm.   Neurological:      General: No focal deficit present.      Mental Status: He is oriented to person, place, and time.   Psychiatric:         Mood and Affect: Mood normal.         Assessment/Plan   Lonny was seen today for hypertension and erectile dysfunction.  Diagnoses and all orders for this visit:  Stage 3a chronic kidney disease (CMS/HCC) (Primary)  Erectile dysfunction, unspecified erectile dysfunction type  -     sildenafil (Viagra) 100 mg tablet; Take 1 tablet (100 mg) by mouth if needed for erectile dysfunction.  Obstructive sleep apnea, adult  Hyperuricemia  Arthritis  Hyperchylomicronemia  Hypothyroidism, adult  Hypertension, unspecified type  Other orders  -     Follow Up In Primary " Care - Established  -     Follow Up In Primary Care - Medicare Annual; Future   - Hypertension improved continue with Imdur 120 no complaints  -Renal function worsening creatinine now 1.7  Need to change Dyazide to take it every other day  -Erectile dysfunction May use sildenafil as recommended new prescription provided today 30 tablets for 90-day supply  - Patient underwent uneventful surgery for ectropion of the eyelids doing much better awaiting another surgery in April 2024  -Fungus toenails extensive patient counseled about prevention  Start on Penlac 8% apply daily for 6 months follow-up results closely  - Chronic renal insufficiency obtain BMP before next appointment for further recommendation  - Hypothyroid controlled  - Hypercholesteremia controlled continue with current medication  - Hypertension controlled continue current medication  -- Erectile dysfunction controlled continue with current medication  - Obstructive sleep apnea compensated  Follow-up in April 2024 for Medicare physical

## 2023-11-25 DIAGNOSIS — I10 HYPERTENSION, UNSPECIFIED TYPE: ICD-10-CM

## 2023-11-27 ENCOUNTER — APPOINTMENT (OUTPATIENT)
Dept: PRIMARY CARE | Facility: CLINIC | Age: 78
End: 2023-11-27
Payer: MEDICARE

## 2023-11-28 RX ORDER — TRIAMTERENE AND HYDROCHLOROTHIAZIDE 37.5; 25 MG/1; MG/1
1 CAPSULE ORAL 2 TIMES WEEKLY
Qty: 45 CAPSULE | Refills: 0 | Status: SHIPPED | OUTPATIENT
Start: 2023-11-30 | End: 2024-04-10

## 2023-11-30 DIAGNOSIS — I10 HYPERTENSION, UNSPECIFIED TYPE: ICD-10-CM

## 2023-11-30 RX ORDER — TRIAMTERENE AND HYDROCHLOROTHIAZIDE 37.5; 25 MG/1; MG/1
1 CAPSULE ORAL 2 TIMES WEEKLY
OUTPATIENT
Start: 2023-11-30

## 2023-12-19 ENCOUNTER — OFFICE VISIT (OUTPATIENT)
Dept: ORTHOPEDIC SURGERY | Facility: CLINIC | Age: 78
End: 2023-12-19
Payer: COMMERCIAL

## 2023-12-19 DIAGNOSIS — M19.141 POST-TRAUMATIC OSTEOARTHRITIS OF RIGHT HAND: ICD-10-CM

## 2023-12-19 DIAGNOSIS — M18.31 POST-TRAUMATIC OSTEOARTHRITIS OF FIRST CARPOMETACARPAL JOINT OF RIGHT HAND: Primary | ICD-10-CM

## 2023-12-19 PROCEDURE — 1125F AMNT PAIN NOTED PAIN PRSNT: CPT | Performed by: ORTHOPAEDIC SURGERY

## 2023-12-19 PROCEDURE — 1160F RVW MEDS BY RX/DR IN RCRD: CPT | Performed by: ORTHOPAEDIC SURGERY

## 2023-12-19 PROCEDURE — G0463 HOSPITAL OUTPT CLINIC VISIT: HCPCS | Performed by: ORTHOPAEDIC SURGERY

## 2023-12-19 PROCEDURE — 99213 OFFICE O/P EST LOW 20 MIN: CPT | Performed by: ORTHOPAEDIC SURGERY

## 2023-12-19 PROCEDURE — 1159F MED LIST DOCD IN RCRD: CPT | Performed by: ORTHOPAEDIC SURGERY

## 2023-12-19 PROCEDURE — 1036F TOBACCO NON-USER: CPT | Performed by: ORTHOPAEDIC SURGERY

## 2023-12-19 ASSESSMENT — ENCOUNTER SYMPTOMS
CHILLS: 0
FEVER: 0
WHEEZING: 0
FATIGUE: 0
ARTHRALGIAS: 1
SHORTNESS OF BREATH: 0

## 2023-12-19 ASSESSMENT — PAIN SCALES - GENERAL: PAINLEVEL_OUTOF10: 6

## 2023-12-19 ASSESSMENT — PAIN - FUNCTIONAL ASSESSMENT: PAIN_FUNCTIONAL_ASSESSMENT: 0-10

## 2023-12-19 NOTE — PROGRESS NOTES
Reason for Appointment  Chief Complaint   Patient presents with    Right Hand - Follow-up     [facility]     History of Present Illness  Patient is a 78 y.o. male here today for follow-up evaluation of his right hand. His pain is at baseline today, and he takes OTC pain medication as needed. He is not complaining of any triggering. There is pain through the base of the thumb, index, and long fingers. He does not wear a brace. No other updates to [facility], allergies, or medications.     Past Medical History:   Diagnosis Date    Allergic contact dermatitis due to plants, except food 12/02/2020    Poison ivy    Allergic contact dermatitis due to plants, except food 10/12/2015    Contact dermatitis due to poison ivy    Body mass index (BMI) 27.0-27.9, adult 06/02/2020    BMI 27.0-27.9,adult    Body mass index (BMI) 29.0-29.9, adult 09/15/2021    Body mass index (BMI) of 29.0 to 29.9 in adult    Encounter for screening for malignant neoplasm of prostate 12/22/2014    Encounter for prostate cancer screening    Other amnesia 12/19/2013    Memory loss    Personal history of diseases of the skin and subcutaneous tissue     History of psoriasis    Personal history of other (healed) physical injury and trauma 03/31/2016    History of insect bite    Personal history of other diseases of male genital organs 11/21/2017    History of erectile dysfunction    Personal history of other diseases of the digestive system 05/29/2019    History of umbilical hernia    Personal history of other diseases of the musculoskeletal system and connective tissue 04/16/2014    History of low back pain    Personal history of other diseases of the nervous system and sense organs 02/06/2014    History of sciatica    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 Sig Documenting Provider Last Dose Status   amLODIPine (Norvasc) 10 mg tablet [id number] Yes Take 1 tablet (10 mg) by mouth once daily. Leann Dunaway MD Taking Active   aspirin 81 mg EC tablet [id number] Yes Take 1 tablet (81 mg) by mouth 1 time. Historical Provider, MD Taking Active   cholecalciferol, vitamin D3, (VITAMIN D3 ORAL) [id number] Yes Vitamin D3 Historical Provider, MD Taking Active   levothyroxine (Synthroid, Levoxyl) 50 mcg tablet [id number] Yes Take 1 tablet (50 mcg) by mouth once daily. Leann Dunaway MD Taking Active   losartan (Cozaar) 100 mg tablet [id number] Yes Take 1 tablet (100 mg) by mouth once daily. Leann Dunaway MD Taking Active   multivitamin with minerals (multivitamin-iron-folic acid) tablet [id number] Yes Take 1 tablet by mouth once daily. Historical Provider, MD Taking Active   NON FORMULARY [id number] Yes prevagen Historical Provider, MD Taking Active   sildenafil (Viagra) 100 mg tablet [id number] Yes Take 1 tablet (100 mg) by mouth if needed for erectile dysfunction. Leann Dunaway MD Taking Active   simvastatin (Zocor) 20 mg tablet [id number] Yes Take 1 tablet (20 mg) by mouth once daily in the evening. Leann Dunaway MD Taking Active   triamterene-hydrochlorothiazid (Dyazide) 37.5-25 mg capsule [id number] Yes TAKE ONE CAPSULE BY MOUTH TWICE WEEKLY Leann Dunaway MD Taking Active                    No Known Allergies    Review of Systems   Constitutional:  Negative for chills, fatigue and fever.   Respiratory:  Negative for shortness of breath and wheezing.    Cardiovascular:  Negative for chest pain and leg swelling.   Musculoskeletal:  Positive for arthralgias.   All  other systems reviewed and are negative.    Exam   On exam of the right hand, he does have arthritic changes and swelling through the base of the long, index and thumb CMC joints, decreased global thumb motion, lacks about 10 degrees of composite flexion, good pulses and sensation in the hand, good cap refill.     Assessment   Encounter Diagnoses   Name Primary?    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 I recommended that he try applying topical treatment to the hand such as Volatren gel. Follow-up in several months.     I, Bianca Seo, attest that this documentation has been prepared under the direction and in the presence of Palmer Schrader MD. By signing below, I, Palmer Schrader MD, personally performed the services described in this documentation. All medical record entries made by the scribe were at my direction and in my presence. I have reviewed the chart and agree that the record reflects my personal performance and is accurate and complete. 12/19/23

## 2024-04-10 DIAGNOSIS — I10 HYPERTENSION, UNSPECIFIED TYPE: ICD-10-CM

## 2024-04-10 RX ORDER — TRIAMTERENE AND HYDROCHLOROTHIAZIDE 37.5; 25 MG/1; MG/1
1 CAPSULE ORAL 2 TIMES WEEKLY
Qty: 45 CAPSULE | Refills: 0 | Status: SHIPPED | OUTPATIENT
Start: 2024-04-11

## 2024-04-25 ENCOUNTER — OFFICE VISIT (OUTPATIENT)
Dept: PRIMARY CARE | Facility: CLINIC | Age: 79
End: 2024-04-25
Payer: MEDICARE

## 2024-04-25 VITALS
DIASTOLIC BLOOD PRESSURE: 70 MMHG | BODY MASS INDEX: 28.64 KG/M2 | OXYGEN SATURATION: 99 % | WEIGHT: 189 LBS | SYSTOLIC BLOOD PRESSURE: 134 MMHG | HEIGHT: 68 IN | HEART RATE: 66 BPM | TEMPERATURE: 97.7 F

## 2024-04-25 DIAGNOSIS — Z79.899 HIGH RISK MEDICATION USE: ICD-10-CM

## 2024-04-25 DIAGNOSIS — I10 HYPERTENSION, UNSPECIFIED TYPE: ICD-10-CM

## 2024-04-25 DIAGNOSIS — E78.5 DYSLIPIDEMIA: ICD-10-CM

## 2024-04-25 DIAGNOSIS — N52.9 ERECTILE DYSFUNCTION, UNSPECIFIED ERECTILE DYSFUNCTION TYPE: ICD-10-CM

## 2024-04-25 DIAGNOSIS — H02.403 EYELID DROOPING DISEASE, BILATERAL: ICD-10-CM

## 2024-04-25 DIAGNOSIS — Z13.89 SCREENING FOR MULTIPLE CONDITIONS: ICD-10-CM

## 2024-04-25 DIAGNOSIS — Z01.818 PREOPERATIVE CLEARANCE: ICD-10-CM

## 2024-04-25 DIAGNOSIS — Z00.00 ROUTINE GENERAL MEDICAL EXAMINATION AT HEALTH CARE FACILITY: Primary | ICD-10-CM

## 2024-04-25 DIAGNOSIS — E55.9 VITAMIN D DEFICIENCY, UNSPECIFIED: ICD-10-CM

## 2024-04-25 DIAGNOSIS — N18.31 STAGE 3A CHRONIC KIDNEY DISEASE (MULTI): ICD-10-CM

## 2024-04-25 DIAGNOSIS — R53.83 OTHER FATIGUE: ICD-10-CM

## 2024-04-25 DIAGNOSIS — E53.8 VITAMIN B12 DEFICIENCY: ICD-10-CM

## 2024-04-25 DIAGNOSIS — E78.00 HYPERCHOLESTEREMIA: ICD-10-CM

## 2024-04-25 DIAGNOSIS — Z11.59 NEED FOR HEPATITIS C SCREENING TEST: ICD-10-CM

## 2024-04-25 DIAGNOSIS — E03.9 HYPOTHYROIDISM, ADULT: ICD-10-CM

## 2024-04-25 PROCEDURE — 1160F RVW MEDS BY RX/DR IN RCRD: CPT | Performed by: INTERNAL MEDICINE

## 2024-04-25 PROCEDURE — G0439 PPPS, SUBSEQ VISIT: HCPCS | Performed by: INTERNAL MEDICINE

## 2024-04-25 PROCEDURE — 1170F FXNL STATUS ASSESSED: CPT | Performed by: INTERNAL MEDICINE

## 2024-04-25 PROCEDURE — 1158F ADVNC CARE PLAN TLK DOCD: CPT | Performed by: INTERNAL MEDICINE

## 2024-04-25 PROCEDURE — 93000 ELECTROCARDIOGRAM COMPLETE: CPT | Performed by: INTERNAL MEDICINE

## 2024-04-25 PROCEDURE — 1159F MED LIST DOCD IN RCRD: CPT | Performed by: INTERNAL MEDICINE

## 2024-04-25 PROCEDURE — 99214 OFFICE O/P EST MOD 30 MIN: CPT | Performed by: INTERNAL MEDICINE

## 2024-04-25 PROCEDURE — 1123F ACP DISCUSS/DSCN MKR DOCD: CPT | Performed by: INTERNAL MEDICINE

## 2024-04-25 PROCEDURE — 3075F SYST BP GE 130 - 139MM HG: CPT | Performed by: INTERNAL MEDICINE

## 2024-04-25 PROCEDURE — 3078F DIAST BP <80 MM HG: CPT | Performed by: INTERNAL MEDICINE

## 2024-04-25 PROCEDURE — 1036F TOBACCO NON-USER: CPT | Performed by: INTERNAL MEDICINE

## 2024-04-25 RX ORDER — LEVOTHYROXINE SODIUM 50 UG/1
50 TABLET ORAL DAILY
Qty: 90 TABLET | Refills: 1 | Status: SHIPPED | OUTPATIENT
Start: 2024-04-25

## 2024-04-25 RX ORDER — SIMVASTATIN 20 MG/1
20 TABLET, FILM COATED ORAL EVERY EVENING
Qty: 90 TABLET | Refills: 1 | Status: SHIPPED | OUTPATIENT
Start: 2024-04-25

## 2024-04-25 RX ORDER — LOSARTAN POTASSIUM 100 MG/1
100 TABLET ORAL DAILY
Qty: 90 TABLET | Refills: 1 | Status: SHIPPED | OUTPATIENT
Start: 2024-04-25

## 2024-04-25 RX ORDER — SILDENAFIL 100 MG/1
100 TABLET, FILM COATED ORAL AS NEEDED
Qty: 30 TABLET | Refills: 1 | Status: SHIPPED | OUTPATIENT
Start: 2024-04-25 | End: 2024-07-24

## 2024-04-25 RX ORDER — AMLODIPINE BESYLATE 10 MG/1
10 TABLET ORAL DAILY
Qty: 90 TABLET | Refills: 1 | Status: SHIPPED | OUTPATIENT
Start: 2024-04-25

## 2024-04-25 ASSESSMENT — ENCOUNTER SYMPTOMS
FEVER: 0
DIARRHEA: 0
SORE THROAT: 0
DEPRESSION: 0
OCCASIONAL FEELINGS OF UNSTEADINESS: 0
ARTHRALGIAS: 0
DIFFICULTY URINATING: 0
SINUS PAIN: 0
ABDOMINAL PAIN: 0
BRUISES/BLEEDS EASILY: 0
HEADACHES: 0
LOSS OF SENSATION IN FEET: 0
FATIGUE: 0
DIZZINESS: 0
UNEXPECTED WEIGHT CHANGE: 0
WHEEZING: 0
PALPITATIONS: 0
HYPERTENSION: 1
COUGH: 0
BLOOD IN STOOL: 0

## 2024-04-25 ASSESSMENT — ACTIVITIES OF DAILY LIVING (ADL)
GROCERY_SHOPPING: INDEPENDENT
TAKING_MEDICATION: INDEPENDENT
MANAGING_FINANCES: INDEPENDENT
DOING_HOUSEWORK: INDEPENDENT
DRESSING: INDEPENDENT
BATHING: INDEPENDENT

## 2024-04-25 ASSESSMENT — PATIENT HEALTH QUESTIONNAIRE - PHQ9
1. LITTLE INTEREST OR PLEASURE IN DOING THINGS: SEVERAL DAYS
2. FEELING DOWN, DEPRESSED OR HOPELESS: NOT AT ALL
SUM OF ALL RESPONSES TO PHQ9 QUESTIONS 1 AND 2: 1

## 2024-04-25 NOTE — PROGRESS NOTES
Subjective   Reason for Visit: Lonny Reece is an 78 y.o. male here for a Medicare Wellness visit.          Reviewed all medications by prescribing practitioner or clinical pharmacist (such as prescriptions, OTCs, herbal therapies and supplements) and documented in the medical record.    Patient comes today for Medicare physical also preoperative clearance for eye surgery asked by Dr. Tracey    Annual Medicare physical  - Vaccinations reviewed patient due for booster for tetanus and Shingrix vaccine ordered today  - Screening for colon cancer not needed because of patient age asymptomatic  -Screening for depression negative  I spent 15 minutes obtaining and discussing depression screening using PHQ-2 questions with results documented in the chart.  - Advance of directive reviewed    Follow-up  -Patient scheduled for blepharoplasty with Dr. 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   Hypertension  Pertinent negatives include no chest pain, headaches or palpitations.   Med Refill  Pertinent negatives include no abdominal pain, arthralgias, chest  "pain, congestion, coughing, fatigue, fever, headaches, rash or sore throat.       Patient Care Team:  Leann Dunaway MD as PCP - General  Leann Dunaway MD as PCP - [facility]     Review of Systems   Constitutional:  Negative for fatigue, fever and unexpected weight change.   HENT:  Negative for congestion, ear discharge, ear pain, mouth sores, sinus pain and sore throat.    Eyes:  Positive for visual disturbance.   Respiratory:  Negative for cough and wheezing.    Cardiovascular:  Negative for chest pain, palpitations and leg swelling.   Gastrointestinal:  Negative for abdominal pain, blood in stool and diarrhea.   Genitourinary:  Negative for difficulty urinating.   Musculoskeletal:  Negative for arthralgias.   Skin:  Negative for rash.   Neurological:  Negative for dizziness and headaches.   Hematological:  Does not bruise/bleed easily.   Psychiatric/Behavioral:  Negative for behavioral problems.    All other systems reviewed and are negative.      Objective   Vitals:  /70   Pulse 66   Temp 36.5 °C (97.7 °F)   Ht 1.727 m (5' 8\")   Wt 85.7 kg (189 lb)   SpO2 99%   BMI 28.74 kg/m²     Lab Results   Component Value Date    WBC 6.6 05/15/2023    HGB 13.5 05/15/2023    HCT 41.3 05/15/2023     05/15/2023    CHOL 144 05/15/2023    TRIG 125 05/15/2023    HDL 48.0 05/15/2023    ALT 15 05/15/2023    AST 18 05/15/2023     11/16/2023    K 4.6 11/16/2023     11/16/2023    CREATININE 1.78 (H) 11/16/2023    BUN 34 (H) 11/16/2023    CO2 25 11/16/2023    TSH 1.60 05/15/2023     par   Physical Exam  Vitals and nursing note reviewed.   Constitutional:       Appearance: Normal appearance.   HENT:      Head: Normocephalic.      Nose: Nose normal.   Eyes:      Conjunctiva/sclera: Conjunctivae normal.      Pupils: Pupils are equal, round, and reactive to light.      Comments: Bilateral upper eyelid drooping   Cardiovascular:      Rate and Rhythm: Regular rhythm.   Pulmonary:      " Effort: Pulmonary effort is normal.      Breath sounds: Normal breath sounds.   Abdominal:      General: Abdomen is flat.      Palpations: Abdomen is soft.   Musculoskeletal:      Cervical back: Neck supple.   Skin:     General: Skin is warm.   Neurological:      General: No focal deficit present.      Mental Status: He is oriented to person, place, and time.   Psychiatric:         Mood and Affect: Mood normal.         Assessment/Plan   Problem List Items Addressed This Visit       Erectile dysfunction    Relevant Medications    sildenafil (Viagra) 100 mg tablet    Hypertension    Relevant Medications    amLODIPine (Norvasc) 10 mg tablet    losartan (Cozaar) 100 mg tablet    Other Relevant Orders    Comprehensive Metabolic Panel    Hypothyroidism, adult    Relevant Medications    levothyroxine (Synthroid, Levoxyl) 50 mcg tablet     Other Visit Diagnoses       Routine general medical examination at health care facility    -  Primary    Relevant Medications    diphth,pertus,acell,,tetanus (BoostRIX) 2.5-8-5 Lf-mcg-Lf/0.5mL injection    zoster vaccine-recombinant adjuvanted (Shingrix) 50 mcg/0.5 mL vaccine    Preoperative clearance        Relevant Orders    ECG 12 Lead    Dyslipidemia        Relevant Medications    simvastatin (Zocor) 20 mg tablet    Screening for multiple conditions        Need for hepatitis C screening test        Relevant Orders    Hepatitis C antibody    High risk medication use        Relevant Orders    CBC and Auto Differential    Hypercholesteremia        Relevant Orders    Lipid Panel    Other fatigue        Relevant Orders    TSH with reflex to Free T4 if abnormal    Vitamin B12 deficiency        Relevant Orders    Vitamin B12    Vitamin D deficiency, unspecified        Relevant Orders    Vitamin D 25-Hydroxy,Total (for eval of Vitamin D levels)          Patient comes today for Medicare physical also preoperative clearance for eye surgery asked by Dr. Tracey    Annual Medicare physical  -  Vaccinations reviewed patient due for booster for tetanus and Shingrix vaccine ordered today  - Screening for colon cancer not needed because of patient age asymptomatic  -Screening for depression negative  I spent 15 minutes obtaining and discussing depression screening using PHQ-2 questions with results documented in the chart.  - Advance of directive reviewed    Follow-up  -Patient scheduled for blepharoplasty with Dr. 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

## 2024-04-25 NOTE — PROGRESS NOTES
"Subjective   Patient ID: Lonny Reece is a 78 y.o. male who presents for Pre-op Exam (Bilateral blepharoplasty upper eyelids bilateral putterman procedure on 4/30/24 with Dr. Ron Zhou), Medicare Annual Wellness Visit Subsequent, Erectile Dysfunction, Hyperlipidemia, Hypertension, Hypothyroidism, and Med Refill.    HPI       Review of Systems    Objective   No results found for: \"HGBA1C\"   /70   Pulse 66   Temp 36.5 °C (97.7 °F)   Ht 1.727 m (5' 8\")   Wt 85.7 kg (189 lb)   SpO2 99%   BMI 28.74 kg/m²     Physical Exam    Assessment/Plan   Lonny was seen today for pre-op exam, medicare annual wellness visit subsequent, erectile dysfunction, hyperlipidemia, hypertension, hypothyroidism and med refill.  Diagnoses and all orders for this visit:  Preoperative clearance  -     ECG 12 Lead  Hypertension, unspecified type  Hypothyroidism, adult  Erectile dysfunction, unspecified erectile dysfunction type  Dyslipidemia  Other orders  -     Follow Up In Primary Care - Medicare Annual     "

## 2024-05-16 ENCOUNTER — LAB (OUTPATIENT)
Dept: LAB | Facility: LAB | Age: 79
End: 2024-05-16
Payer: MEDICARE

## 2024-05-16 DIAGNOSIS — Z11.59 NEED FOR HEPATITIS C SCREENING TEST: ICD-10-CM

## 2024-05-16 DIAGNOSIS — Z79.899 HIGH RISK MEDICATION USE: ICD-10-CM

## 2024-05-16 DIAGNOSIS — R53.83 OTHER FATIGUE: ICD-10-CM

## 2024-05-16 DIAGNOSIS — I10 HYPERTENSION, UNSPECIFIED TYPE: ICD-10-CM

## 2024-05-16 DIAGNOSIS — E78.00 HYPERCHOLESTEREMIA: ICD-10-CM

## 2024-05-16 DIAGNOSIS — E55.9 VITAMIN D DEFICIENCY, UNSPECIFIED: ICD-10-CM

## 2024-05-16 DIAGNOSIS — E53.8 VITAMIN B12 DEFICIENCY: ICD-10-CM

## 2024-05-16 LAB
ALBUMIN SERPL BCP-MCNC: 4.6 G/DL (ref 3.4–5)
ALP SERPL-CCNC: 87 U/L (ref 33–136)
ALT SERPL W P-5'-P-CCNC: 16 U/L (ref 10–52)
ANION GAP SERPL CALC-SCNC: 14 MMOL/L (ref 10–20)
AST SERPL W P-5'-P-CCNC: 16 U/L (ref 9–39)
BASOPHILS # BLD AUTO: 0.06 X10*3/UL (ref 0–0.1)
BASOPHILS NFR BLD AUTO: 0.5 %
BILIRUB SERPL-MCNC: 0.5 MG/DL (ref 0–1.2)
BUN SERPL-MCNC: 32 MG/DL (ref 6–23)
CALCIUM SERPL-MCNC: 9.8 MG/DL (ref 8.6–10.3)
CHLORIDE SERPL-SCNC: 105 MMOL/L (ref 98–107)
CHOLEST SERPL-MCNC: 160 MG/DL (ref 0–199)
CHOLESTEROL/HDL RATIO: 3.9
CO2 SERPL-SCNC: 24 MMOL/L (ref 21–32)
CREAT SERPL-MCNC: 1.57 MG/DL (ref 0.5–1.3)
EGFRCR SERPLBLD CKD-EPI 2021: 45 ML/MIN/1.73M*2
EOSINOPHIL # BLD AUTO: 0.22 X10*3/UL (ref 0–0.4)
EOSINOPHIL NFR BLD AUTO: 1.8 %
ERYTHROCYTE [DISTWIDTH] IN BLOOD BY AUTOMATED COUNT: 12.4 % (ref 11.5–14.5)
GLUCOSE SERPL-MCNC: 107 MG/DL (ref 74–99)
HCT VFR BLD AUTO: 41.6 % (ref 41–52)
HDLC SERPL-MCNC: 41.3 MG/DL
HGB BLD-MCNC: 13.9 G/DL (ref 13.5–17.5)
IMM GRANULOCYTES # BLD AUTO: 0.05 X10*3/UL (ref 0–0.5)
IMM GRANULOCYTES NFR BLD AUTO: 0.4 % (ref 0–0.9)
LDLC SERPL CALC-MCNC: 87 MG/DL
LYMPHOCYTES # BLD AUTO: 1.84 X10*3/UL (ref 0.8–3)
LYMPHOCYTES NFR BLD AUTO: 15.3 %
MCH RBC QN AUTO: 31 PG (ref 26–34)
MCHC RBC AUTO-ENTMCNC: 33.4 G/DL (ref 32–36)
MCV RBC AUTO: 93 FL (ref 80–100)
MONOCYTES # BLD AUTO: 0.57 X10*3/UL (ref 0.05–0.8)
MONOCYTES NFR BLD AUTO: 4.7 %
NEUTROPHILS # BLD AUTO: 9.3 X10*3/UL (ref 1.6–5.5)
NEUTROPHILS NFR BLD AUTO: 77.3 %
NON HDL CHOLESTEROL: 119 MG/DL (ref 0–149)
NRBC BLD-RTO: 0 /100 WBCS (ref 0–0)
PLATELET # BLD AUTO: 286 X10*3/UL (ref 150–450)
POTASSIUM SERPL-SCNC: 4.4 MMOL/L (ref 3.5–5.3)
PROT SERPL-MCNC: 7.8 G/DL (ref 6.4–8.2)
RBC # BLD AUTO: 4.49 X10*6/UL (ref 4.5–5.9)
SODIUM SERPL-SCNC: 139 MMOL/L (ref 136–145)
TRIGL SERPL-MCNC: 159 MG/DL (ref 0–149)
TSH SERPL-ACNC: 1.79 MIU/L (ref 0.44–3.98)
VLDL: 32 MG/DL (ref 0–40)
WBC # BLD AUTO: 12 X10*3/UL (ref 4.4–11.3)

## 2024-05-16 PROCEDURE — 84443 ASSAY THYROID STIM HORMONE: CPT

## 2024-05-16 PROCEDURE — 86803 HEPATITIS C AB TEST: CPT

## 2024-05-16 PROCEDURE — 36415 COLL VENOUS BLD VENIPUNCTURE: CPT

## 2024-05-16 PROCEDURE — 80061 LIPID PANEL: CPT

## 2024-05-16 PROCEDURE — 85025 COMPLETE CBC W/AUTO DIFF WBC: CPT

## 2024-05-16 PROCEDURE — 82306 VITAMIN D 25 HYDROXY: CPT

## 2024-05-16 PROCEDURE — 82607 VITAMIN B-12: CPT

## 2024-05-16 PROCEDURE — 80053 COMPREHEN METABOLIC PANEL: CPT

## 2024-05-17 LAB
25(OH)D3 SERPL-MCNC: 87 NG/ML (ref 30–100)
HCV AB SER QL: NONREACTIVE
VIT B12 SERPL-MCNC: 601 PG/ML (ref 211–911)

## 2024-06-18 ENCOUNTER — OFFICE VISIT (OUTPATIENT)
Dept: ORTHOPEDIC SURGERY | Facility: CLINIC | Age: 79
End: 2024-06-18
Payer: COMMERCIAL

## 2024-06-18 DIAGNOSIS — M19.041 ARTHRITIS OF RIGHT HAND: Primary | ICD-10-CM

## 2024-06-18 PROCEDURE — 99213 OFFICE O/P EST LOW 20 MIN: CPT | Performed by: ORTHOPAEDIC SURGERY

## 2024-06-18 ASSESSMENT — PAIN SCALES - GENERAL: PAINLEVEL_OUTOF10: 6

## 2024-06-18 ASSESSMENT — ENCOUNTER SYMPTOMS
FEVER: 0
FATIGUE: 0
CHILLS: 0
ARTHRALGIAS: 1
WHEEZING: 0
SINUS PAIN: 0
JOINT SWELLING: 1
SHORTNESS OF BREATH: 0
TROUBLE SWALLOWING: 0

## 2024-06-18 ASSESSMENT — PAIN - FUNCTIONAL ASSESSMENT: PAIN_FUNCTIONAL_ASSESSMENT: 0-10

## 2024-06-18 NOTE — PROGRESS NOTES
Reason for Appointment  Chief Complaint   Patient presents with    Right Hand - Follow-up     History of Present Illness  Patient is a 78 y.o. male here today for a [facility] case follow-up evaluation of baseline right thumb pain.  He is noticing some more stiffness in the thumb, he occasionally wears a brace.  He does take over-the-counter medications as needed for increases in pain.  He has not had an injection in some time.  No recent injuries or falls.  No other changes in his past medical history, allergies, or medications.     Past Medical History:   Diagnosis Date    Allergic contact dermatitis due to plants, except food 12/02/2020    Poison ivy    Allergic contact dermatitis due to plants, except food 10/12/2015    Contact dermatitis due to poison ivy    Body mass index (BMI) 27.0-27.9, adult 06/02/2020    BMI 27.0-27.9,adult    Body mass index (BMI) 29.0-29.9, adult 09/15/2021    Body mass index (BMI) of 29.0 to 29.9 in adult    Encounter for screening for malignant neoplasm of prostate 12/22/2014    Encounter for prostate cancer screening    Other amnesia 12/19/2013    Memory loss    Personal history of diseases of the skin and subcutaneous tissue     History of psoriasis    Personal history of other (healed) physical injury and trauma 03/31/2016    History of insect bite    Personal history of other diseases of male genital organs 11/21/2017    History of erectile dysfunction    Personal history of other diseases of the digestive system 05/29/2019    History of umbilical hernia    Personal history of other diseases of the musculoskeletal system and connective tissue 04/16/2014    History of low back pain    Personal history of other diseases of the nervous system and sense organs 02/06/2014    History of sciatica    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 Sig Documenting Provider Last Dose Status   amLODIPine (Norvasc) 10 mg tablet [id number] Yes Take 1 tablet (10 mg) by mouth once daily. Leann Dunaway MD Taking Active   aspirin 81 mg EC tablet [id number] Yes Take 1 tablet (81 mg) by mouth 1 time. Historical Provider, MD Taking Active   cholecalciferol, vitamin D3, (VITAMIN D3 ORAL) [id number] Yes Vitamin D3 Historical Provider, MD Taking Active   levothyroxine (Synthroid, Levoxyl) 50 mcg tablet [id number] Yes Take 1 tablet (50 mcg) by mouth once daily. Leann Dunaway MD Taking Active   losartan (Cozaar) 100 mg tablet [id number] Yes Take 1 tablet (100 mg) by mouth once daily. Leann Dunaway MD Taking Active   multivitamin with minerals (multivitamin-iron-folic acid) tablet [id number] Yes Take 1 tablet by mouth once daily. Historical Provider, MD Taking Active   NON FORMULARY [id number] Yes prevagen Historical Provider, MD Taking Active   sildenafil (Viagra) 100 mg tablet [id number] Yes Take 1 tablet (100 mg) by mouth if needed for erectile dysfunction. Leann Dunaway MD Taking Active   simvastatin (Zocor) 20 mg tablet [id number] Yes Take 1 tablet (20 mg) by mouth once daily in the evening. Leann Dunaway MD Taking Active   triamterene-hydrochlorothiazid (Dyazide) 37.5-25 mg capsule [id number] Yes TAKE ONE CAPSULE BY MOUTH TWICE WEEKLY Leann Dunaway MD Taking Active                    No Known Allergies    Review of Systems   Constitutional:  Negative for chills, fatigue and fever.   HENT:  Negative for hearing loss, sinus pain and trouble swallowing.    Respiratory:  Negative for shortness of breath and  wheezing.    Cardiovascular:  Negative for chest pain and leg swelling.   Musculoskeletal:  Positive for arthralgias and joint swelling.   Skin:  Negative for pallor and rash.     Exam   On exam right hand shows swelling over the base of the right thumb.  He has tenderness over the right thumb CMC joint, stiffness with thumb motion and some lack of composite flexion of the other digits.  Good pulses and capillary refill in the upper extremity.    Assessment   Right hand osteoarthritis    Plan   He is noticing some increased stiffness in the hand, hopefully pain will slowly improve as stiffness increases and he can get to a functional point and we can avoid any further intervention.  He has had injections in the past but we would like to avoid these if possible.  He can follow-up with us on a 3-month basis unless he has any new issues.    Written by Miriam Schrader saw, evaluated, and treated the patient with the PA

## 2024-07-07 DIAGNOSIS — I10 HYPERTENSION, UNSPECIFIED TYPE: ICD-10-CM

## 2024-07-08 RX ORDER — TRIAMTERENE AND HYDROCHLOROTHIAZIDE 37.5; 25 MG/1; MG/1
1 CAPSULE ORAL 2 TIMES WEEKLY
Qty: 26 CAPSULE | Refills: 1 | Status: SHIPPED | OUTPATIENT
Start: 2024-07-08 | End: 2024-07-11 | Stop reason: SDUPTHER

## 2024-07-11 ENCOUNTER — APPOINTMENT (OUTPATIENT)
Dept: PRIMARY CARE | Facility: CLINIC | Age: 79
End: 2024-07-11
Payer: MEDICARE

## 2024-07-11 VITALS
TEMPERATURE: 97.6 F | WEIGHT: 186 LBS | HEART RATE: 72 BPM | SYSTOLIC BLOOD PRESSURE: 130 MMHG | DIASTOLIC BLOOD PRESSURE: 60 MMHG | BODY MASS INDEX: 28.28 KG/M2 | OXYGEN SATURATION: 97 %

## 2024-07-11 DIAGNOSIS — Q61.3 POLYCYSTIC KIDNEY DISEASE: ICD-10-CM

## 2024-07-11 DIAGNOSIS — I10 HYPERTENSION, UNSPECIFIED TYPE: ICD-10-CM

## 2024-07-11 DIAGNOSIS — E03.9 HYPOTHYROIDISM, ADULT: ICD-10-CM

## 2024-07-11 DIAGNOSIS — N52.9 ERECTILE DYSFUNCTION, UNSPECIFIED ERECTILE DYSFUNCTION TYPE: ICD-10-CM

## 2024-07-11 DIAGNOSIS — E78.5 DYSLIPIDEMIA: ICD-10-CM

## 2024-07-11 DIAGNOSIS — N18.31 STAGE 3A CHRONIC KIDNEY DISEASE (MULTI): Primary | ICD-10-CM

## 2024-07-11 DIAGNOSIS — T78.40XA ALLERGY, INITIAL ENCOUNTER: ICD-10-CM

## 2024-07-11 DIAGNOSIS — G47.33 OBSTRUCTIVE SLEEP APNEA, ADULT: ICD-10-CM

## 2024-07-11 PROCEDURE — 1160F RVW MEDS BY RX/DR IN RCRD: CPT | Performed by: INTERNAL MEDICINE

## 2024-07-11 PROCEDURE — 1159F MED LIST DOCD IN RCRD: CPT | Performed by: INTERNAL MEDICINE

## 2024-07-11 PROCEDURE — 99214 OFFICE O/P EST MOD 30 MIN: CPT | Performed by: INTERNAL MEDICINE

## 2024-07-11 PROCEDURE — 1036F TOBACCO NON-USER: CPT | Performed by: INTERNAL MEDICINE

## 2024-07-11 PROCEDURE — 3078F DIAST BP <80 MM HG: CPT | Performed by: INTERNAL MEDICINE

## 2024-07-11 PROCEDURE — 3075F SYST BP GE 130 - 139MM HG: CPT | Performed by: INTERNAL MEDICINE

## 2024-07-11 RX ORDER — TRIAMTERENE AND HYDROCHLOROTHIAZIDE 37.5; 25 MG/1; MG/1
1 CAPSULE ORAL 2 TIMES WEEKLY
Qty: 26 CAPSULE | Refills: 1 | Status: SHIPPED | OUTPATIENT
Start: 2024-07-11

## 2024-07-11 RX ORDER — MINERAL OIL
180 ENEMA (ML) RECTAL DAILY
Qty: 30 TABLET | Refills: 5 | Status: SHIPPED | OUTPATIENT
Start: 2024-07-11 | End: 2025-07-11

## 2024-07-11 RX ORDER — AMLODIPINE BESYLATE 10 MG/1
10 TABLET ORAL DAILY
Qty: 90 TABLET | Refills: 1 | Status: SHIPPED | OUTPATIENT
Start: 2024-07-11

## 2024-07-11 RX ORDER — SIMVASTATIN 20 MG/1
20 TABLET, FILM COATED ORAL EVERY EVENING
Qty: 90 TABLET | Refills: 1 | Status: SHIPPED | OUTPATIENT
Start: 2024-07-11

## 2024-07-11 RX ORDER — LOSARTAN POTASSIUM 100 MG/1
100 TABLET ORAL DAILY
Qty: 90 TABLET | Refills: 1 | Status: SHIPPED | OUTPATIENT
Start: 2024-07-11

## 2024-07-11 RX ORDER — LEVOTHYROXINE SODIUM 50 UG/1
50 TABLET ORAL DAILY
Qty: 90 TABLET | Refills: 1 | Status: SHIPPED | OUTPATIENT
Start: 2024-07-11

## 2024-07-11 ASSESSMENT — ENCOUNTER SYMPTOMS
DIZZINESS: 0
HEADACHES: 0
ARTHRALGIAS: 0
DIFFICULTY URINATING: 0
HYPERTENSION: 1
FATIGUE: 0
BRUISES/BLEEDS EASILY: 0
DIARRHEA: 0
SORE THROAT: 0
PALPITATIONS: 0
BLOOD IN STOOL: 0
WHEEZING: 0
COUGH: 1
UNEXPECTED WEIGHT CHANGE: 0
ABDOMINAL PAIN: 0
FEVER: 0
SINUS PAIN: 0

## 2024-07-11 NOTE — PROGRESS NOTES
Subjective   Patient ID: Lonny Reece is a 78 y.o. male who presents for Hypothyroidism, Hypertension, and Cough (Chronic, allergies especially when mowing).    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   Cough  Pertinent negatives include no chest pain, ear pain, fever, headaches, rash, sore throat or wheezing.          Review of Systems   Constitutional:  Negative for fatigue, fever and unexpected weight change.   HENT:  Negative for congestion, ear discharge, ear pain, mouth sores, sinus pain and sore throat.    Eyes:  Negative for visual disturbance.   Respiratory:  Positive for cough. Negative for wheezing.    Cardiovascular:  Negative for chest pain, palpitations and leg swelling.   Gastrointestinal:  Negative for abdominal pain, blood in stool and diarrhea.   Genitourinary:  Negative for difficulty urinating.   Musculoskeletal:  Negative for arthralgias.   Skin:  Negative for rash.   Neurological:  Negative for dizziness and headaches.   Hematological:  Does not bruise/bleed easily.   Psychiatric/Behavioral:  Negative for behavioral problems.    All other systems reviewed and are negative.      Objective   No  "results found for: \"HGBA1C\"   /60   Pulse 72   Temp 36.4 °C (97.6 °F)   Wt 84.4 kg (186 lb)   SpO2 97%   BMI 28.28 kg/m²   Lab Results   Component Value Date    WBC 12.0 (H) 05/16/2024    HGB 13.9 05/16/2024    HCT 41.6 05/16/2024     05/16/2024    CHOL 160 05/16/2024    TRIG 159 (H) 05/16/2024    HDL 41.3 05/16/2024    ALT 16 05/16/2024    AST 16 05/16/2024     05/16/2024    K 4.4 05/16/2024     05/16/2024    CREATININE 1.57 (H) 05/16/2024    BUN 32 (H) 05/16/2024    CO2 24 05/16/2024    TSH 1.79 05/16/2024     par   Physical Exam  Vitals and nursing note reviewed.   Constitutional:       Appearance: Normal appearance.   HENT:      Head: Normocephalic.      Nose: Nose normal.   Eyes:      Conjunctiva/sclera: Conjunctivae normal.      Pupils: Pupils are equal, round, and reactive to light.   Cardiovascular:      Rate and Rhythm: Regular rhythm.   Pulmonary:      Effort: Pulmonary effort is normal. No respiratory distress.      Breath sounds: Normal breath sounds. No wheezing, rhonchi or rales.   Chest:      Chest wall: No tenderness.   Abdominal:      General: Abdomen is flat.      Palpations: Abdomen is soft.   Musculoskeletal:      Cervical back: Neck supple.   Skin:     General: Skin is warm.   Neurological:      General: No focal deficit present.      Mental Status: He is oriented to person, place, and time.   Psychiatric:         Mood and Affect: Mood normal.         Assessment/Plan   Lonny was seen today for hypothyroidism, hypertension and cough.  Diagnoses and all orders for this visit:  Stage 3a chronic kidney disease (Multi) (Primary)  Hypertension, unspecified type  -     amLODIPine (Norvasc) 10 mg tablet; Take 1 tablet (10 mg) by mouth once daily.  -     losartan (Cozaar) 100 mg tablet; Take 1 tablet (100 mg) by mouth once daily.  -     triamterene-hydrochlorothiazid (Dyazide) 37.5-25 mg capsule; Take 1 capsule by mouth 2 times a week.  Hypothyroidism, adult  -     " levothyroxine (Synthroid, Levoxyl) 50 mcg tablet; Take 1 tablet (50 mcg) by mouth once daily.  Dyslipidemia  -     simvastatin (Zocor) 20 mg tablet; Take 1 tablet (20 mg) by mouth once daily in the evening.  Polycystic kidney disease  Obstructive sleep apnea, adult  Erectile dysfunction, unspecified erectile dysfunction type  Allergy, initial encounter  -     fexofenadine (Allegra) 180 mg tablet; Take 1 tablet (180 mg) by mouth once daily.  Other orders  -     Follow Up In Primary Care - Established  -     Follow Up In Primary Care - Established; Future   -Patient recent blood work reviewed  - Mild chronic kidney disease no change underlying polycystic kidney disease no complaints no change in renal function continue monitoring  -Hypertension controlled  -Mild cough due to allergies may use Allegra as needed follow-up if no improvement  - Status post bilateral blepharoplasty doing well vision improved  - Continue on-Erectile dysfunction May use sildenafil as recommended new prescription provided today 30 tablets for 90-day supply  - Patient underwent 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

## 2024-07-18 ENCOUNTER — APPOINTMENT (OUTPATIENT)
Dept: PRIMARY CARE | Facility: CLINIC | Age: 79
End: 2024-07-18
Payer: MEDICARE

## 2024-07-25 ENCOUNTER — APPOINTMENT (OUTPATIENT)
Dept: PRIMARY CARE | Facility: CLINIC | Age: 79
End: 2024-07-25
Payer: MEDICARE

## 2024-09-17 ENCOUNTER — OFFICE VISIT (OUTPATIENT)
Dept: ORTHOPEDIC SURGERY | Facility: CLINIC | Age: 79
End: 2024-09-17
Payer: COMMERCIAL

## 2024-09-17 DIAGNOSIS — M18.31 POST-TRAUMATIC OSTEOARTHRITIS OF FIRST CARPOMETACARPAL JOINT OF RIGHT HAND: Primary | ICD-10-CM

## 2024-09-17 PROCEDURE — 99213 OFFICE O/P EST LOW 20 MIN: CPT | Performed by: ORTHOPAEDIC SURGERY

## 2024-09-17 ASSESSMENT — ENCOUNTER SYMPTOMS
SINUS PRESSURE: 0
FEVER: 0
SHORTNESS OF BREATH: 0
FATIGUE: 0
TROUBLE SWALLOWING: 0
WHEEZING: 0
JOINT SWELLING: 1
CHILLS: 0
ARTHRALGIAS: 1

## 2024-09-17 ASSESSMENT — PAIN - FUNCTIONAL ASSESSMENT: PAIN_FUNCTIONAL_ASSESSMENT: 0-10

## 2024-09-17 ASSESSMENT — PAIN SCALES - GENERAL: PAINLEVEL_OUTOF10: 6

## 2024-09-17 NOTE — PROGRESS NOTES
Reason for Appointment  Chief Complaint   Patient presents with    Right Hand - Follow-up     History of Present Illness  Patient is a 78 y.o. male here today for a [facility] follow-up evaluation of baseline right thumb pain.  He continues to get some aching and mild stiffness in the thumb.  He does wear a brace with activity.  He has had previous injections but it has been a long time since the last injection.  No other changes in his past medical history, allergies, or medications.     Past Medical History:   Diagnosis Date    Allergic contact dermatitis due to plants, except food 12/02/2020    Poison ivy    Allergic contact dermatitis due to plants, except food 10/12/2015    Contact dermatitis due to poison ivy    Body mass index (BMI) 27.0-27.9, adult 06/02/2020    BMI 27.0-27.9,adult    Body mass index (BMI) 29.0-29.9, adult 09/15/2021    Body mass index (BMI) of 29.0 to 29.9 in adult    Encounter for screening for malignant neoplasm of prostate 12/22/2014    Encounter for prostate cancer screening    Other amnesia 12/19/2013    Memory loss    Personal history of diseases of the skin and subcutaneous tissue     History of psoriasis    Personal history of other (healed) physical injury and trauma 03/31/2016    History of insect bite    Personal history of other diseases of male genital organs 11/21/2017    History of erectile dysfunction    Personal history of other diseases of the digestive system 05/29/2019    History of umbilical hernia    Personal history of other diseases of the musculoskeletal system and connective tissue 04/16/2014    History of low back pain    Personal history of other diseases of the nervous system and sense organs 02/06/2014    History of sciatica    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 Sig Documenting Provider Last Dose Status   amLODIPine (Norvasc) 10 mg tablet [id number] Yes Take 1 tablet (10 mg) by mouth once daily. Leann Dunaway MD Taking Active   aspirin 81 mg EC tablet [id number] Yes Take 1 tablet (81 mg) by mouth 1 time. Historical Provider, MD Taking Active   cholecalciferol, vitamin D3, (VITAMIN D3 ORAL) [id number] Yes Vitamin D3 Historical Provider, MD Taking Active   fexofenadine (Allegra) 180 mg tablet [id number] Yes Take 1 tablet (180 mg) by mouth once daily. Leann Dunaway MD Taking Active   levothyroxine (Synthroid, Levoxyl) 50 mcg tablet [id number] Yes Take 1 tablet (50 mcg) by mouth once daily. Leann Dunaway MD Taking Active   losartan (Cozaar) 100 mg tablet [id number] Yes Take 1 tablet (100 mg) by mouth once daily. Leann Dunaway MD Taking Active   multivitamin with minerals (multivitamin-iron-folic acid) tablet [id number] Yes Take 1 tablet by mouth once daily. Historical Provider, MD Taking Active   NON FORMULARY [id number] Yes prevagen Historical Provider, MD Taking Active   sildenafil (Viagra) 100 mg tablet [id number]  Take 1 tablet (100 mg) by mouth if needed for erectile dysfunction. Leann Dunaway MD   24 2359   simvastatin (Zocor) 20 mg tablet [id number] Yes Take 1 tablet (20 mg) by mouth once daily in the evening. Leann Dunaway MD Taking Active   triamterene-hydrochlorothiazid (Dyazide) 37.5-25 mg capsule [id number] Yes Take 1 capsule by mouth 2 times a week. Leann Dunaway MD Taking Active                    No Known Allergies    Review of Systems   Constitutional:  Negative for chills, fatigue and fever.   HENT:  Negative for nosebleeds,  sinus pressure and trouble swallowing.    Respiratory:  Negative for shortness of breath and wheezing.    Cardiovascular:  Negative for chest pain and leg swelling.   Musculoskeletal:  Positive for arthralgias and joint swelling.   Skin:  Negative for pallor and rash.     Exam   On exam right hand show swelling over the base of the right thumb.  Tender over the right thumb CMC joint, pain with thumb motion with stiffness compared to the opposite side.  Decent digital motion otherwise with no triggering.  Good pulses and sensation in the upper extremity.    Assessment   Right thumb arthritis    Plan   No further intervention is warranted at this point, he is functioning at baseline today but he is getting some increased stiffness in the thumb.  He is not interested in another injection at this point.  He can follow-up with us in 6 months unless he has any new issues.    Written by Miriam Schrader saw, evaluated, and treated the patient with the PA

## 2024-10-16 ENCOUNTER — APPOINTMENT (OUTPATIENT)
Dept: PRIMARY CARE | Facility: CLINIC | Age: 79
End: 2024-10-16
Payer: MEDICARE

## 2024-10-16 VITALS
OXYGEN SATURATION: 97 % | DIASTOLIC BLOOD PRESSURE: 60 MMHG | SYSTOLIC BLOOD PRESSURE: 130 MMHG | WEIGHT: 188 LBS | TEMPERATURE: 97.3 F | BODY MASS INDEX: 28.59 KG/M2 | HEART RATE: 61 BPM

## 2024-10-16 DIAGNOSIS — E03.9 HYPOTHYROIDISM, ADULT: ICD-10-CM

## 2024-10-16 DIAGNOSIS — E78.5 DYSLIPIDEMIA: ICD-10-CM

## 2024-10-16 DIAGNOSIS — T78.40XA ALLERGY, INITIAL ENCOUNTER: ICD-10-CM

## 2024-10-16 DIAGNOSIS — I10 HYPERTENSION, UNSPECIFIED TYPE: ICD-10-CM

## 2024-10-16 PROCEDURE — 1160F RVW MEDS BY RX/DR IN RCRD: CPT | Performed by: INTERNAL MEDICINE

## 2024-10-16 PROCEDURE — 99214 OFFICE O/P EST MOD 30 MIN: CPT | Performed by: INTERNAL MEDICINE

## 2024-10-16 PROCEDURE — 3078F DIAST BP <80 MM HG: CPT | Performed by: INTERNAL MEDICINE

## 2024-10-16 PROCEDURE — 3075F SYST BP GE 130 - 139MM HG: CPT | Performed by: INTERNAL MEDICINE

## 2024-10-16 PROCEDURE — 1036F TOBACCO NON-USER: CPT | Performed by: INTERNAL MEDICINE

## 2024-10-16 PROCEDURE — 1159F MED LIST DOCD IN RCRD: CPT | Performed by: INTERNAL MEDICINE

## 2024-10-16 RX ORDER — LOSARTAN POTASSIUM 100 MG/1
100 TABLET ORAL DAILY
Qty: 90 TABLET | Refills: 1 | Status: SHIPPED | OUTPATIENT
Start: 2024-10-16 | End: 2024-10-16 | Stop reason: SDUPTHER

## 2024-10-16 RX ORDER — TRIAMTERENE AND HYDROCHLOROTHIAZIDE 37.5; 25 MG/1; MG/1
1 CAPSULE ORAL 2 TIMES WEEKLY
Qty: 26 CAPSULE | Refills: 1 | Status: SHIPPED | OUTPATIENT
Start: 2024-10-17

## 2024-10-16 RX ORDER — AMLODIPINE BESYLATE 10 MG/1
10 TABLET ORAL DAILY
Qty: 90 TABLET | Refills: 1 | Status: SHIPPED | OUTPATIENT
Start: 2024-10-16

## 2024-10-16 RX ORDER — LOSARTAN POTASSIUM 100 MG/1
100 TABLET ORAL DAILY
Qty: 90 TABLET | Refills: 1 | Status: SHIPPED | OUTPATIENT
Start: 2024-10-16

## 2024-10-16 RX ORDER — MINERAL OIL
180 ENEMA (ML) RECTAL DAILY
Qty: 30 TABLET | Refills: 5 | Status: SHIPPED | OUTPATIENT
Start: 2024-10-16 | End: 2025-10-16

## 2024-10-16 RX ORDER — SIMVASTATIN 20 MG/1
20 TABLET, FILM COATED ORAL EVERY EVENING
Qty: 90 TABLET | Refills: 1 | Status: SHIPPED | OUTPATIENT
Start: 2024-10-16

## 2024-10-16 RX ORDER — LEVOTHYROXINE SODIUM 50 UG/1
50 TABLET ORAL DAILY
Qty: 90 TABLET | Refills: 1 | Status: SHIPPED | OUTPATIENT
Start: 2024-10-16

## 2024-10-16 RX ORDER — AMLODIPINE BESYLATE 10 MG/1
10 TABLET ORAL DAILY
Qty: 90 TABLET | Refills: 1 | Status: SHIPPED | OUTPATIENT
Start: 2024-10-16 | End: 2024-10-16 | Stop reason: SDUPTHER

## 2024-10-16 RX ORDER — LEVOTHYROXINE SODIUM 50 UG/1
50 TABLET ORAL DAILY
Qty: 90 TABLET | Refills: 1 | Status: SHIPPED | OUTPATIENT
Start: 2024-10-16 | End: 2024-10-16 | Stop reason: SDUPTHER

## 2024-10-16 RX ORDER — SIMVASTATIN 20 MG/1
20 TABLET, FILM COATED ORAL EVERY EVENING
Qty: 90 TABLET | Refills: 1 | Status: SHIPPED | OUTPATIENT
Start: 2024-10-16 | End: 2024-10-16 | Stop reason: SDUPTHER

## 2024-10-16 ASSESSMENT — ENCOUNTER SYMPTOMS
BRUISES/BLEEDS EASILY: 0
ARTHRALGIAS: 0
SINUS PAIN: 0
HYPERTENSION: 1
ABDOMINAL PAIN: 0
DIFFICULTY URINATING: 0
DIARRHEA: 0
BLOOD IN STOOL: 0
HEADACHES: 0
PALPITATIONS: 0
FATIGUE: 0
UNEXPECTED WEIGHT CHANGE: 0
DIZZINESS: 0
SORE THROAT: 0
WHEEZING: 0
FEVER: 0
COUGH: 0

## 2024-10-16 NOTE — PROGRESS NOTES
Subjective   Patient ID: Lonny Reece is a 79 y.o. male who presents for Hyperlipidemia, Hypertension, Hypothyroidism, and Flu Vaccine (Decline).     -Patient recent blood work reviewed  - Patient declined flu vaccine  - Mild chronic kidney disease no change underlying polycystic kidney disease no complaints no change in renal function continue monitoring  Avoid any NSAIDs  -Hypertension controlled avoid salt continue with weight loss low-carb diet  -Mild cough due to allergies may use Allegra as needed follow-up if no improvement  - Status post bilateral blepharoplasty doing well vision improved doing well  - Continue on-Erectile dysfunction May use sildenafil as recommended new prescription provided today 30 tablets for 90-day supply  - Patient underwent 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   Hypertension  Pertinent negatives include no chest pain, headaches or palpitations.          Review of Systems   Constitutional:  Negative for fatigue, fever and unexpected weight change.   HENT:  Negative for congestion, ear discharge, ear pain, mouth sores, sinus pain and sore throat.    Eyes:  Negative for visual disturbance.   Respiratory:  Negative for cough and wheezing.    Cardiovascular:  Negative for chest pain, palpitations and leg swelling.   Gastrointestinal:  Negative for abdominal pain, blood in stool and diarrhea.   Genitourinary:  Negative for difficulty urinating.   Musculoskeletal:  Negative for arthralgias.   Skin:  Negative for rash.   Neurological:  Negative for dizziness and headaches.   Hematological:  Does not bruise/bleed easily.   Psychiatric/Behavioral:  Negative for behavioral problems.    All other systems reviewed  "and are negative.      Objective   No results found for: \"HGBA1C\"   /60   Pulse 61   Temp 36.3 °C (97.3 °F)   Wt 85.3 kg (188 lb)   SpO2 97%   BMI 28.59 kg/m²   Lab Results   Component Value Date    WBC 12.0 (H) 05/16/2024    HGB 13.9 05/16/2024    HCT 41.6 05/16/2024     05/16/2024    CHOL 160 05/16/2024    TRIG 159 (H) 05/16/2024    HDL 41.3 05/16/2024    ALT 16 05/16/2024    AST 16 05/16/2024     05/16/2024    K 4.4 05/16/2024     05/16/2024    CREATININE 1.57 (H) 05/16/2024    BUN 32 (H) 05/16/2024    CO2 24 05/16/2024    TSH 1.79 05/16/2024     par   Physical Exam  Vitals and nursing note reviewed.   Constitutional:       Appearance: Normal appearance.   HENT:      Head: Normocephalic.      Nose: Nose normal.   Eyes:      Conjunctiva/sclera: Conjunctivae normal.      Pupils: Pupils are equal, round, and reactive to light.   Cardiovascular:      Rate and Rhythm: Regular rhythm.   Pulmonary:      Effort: Pulmonary effort is normal.      Breath sounds: Normal breath sounds.   Abdominal:      General: Abdomen is flat.      Palpations: Abdomen is soft.   Musculoskeletal:      Cervical back: Neck supple.   Skin:     General: Skin is warm.   Neurological:      General: No focal deficit present.      Mental Status: He is oriented to person, place, and time.   Psychiatric:         Mood and Affect: Mood normal.         Assessment/Plan   Lonny was seen today for hyperlipidemia, hypertension, hypothyroidism and flu vaccine.  Diagnoses and all orders for this visit:  Hypertension, unspecified type  -     Discontinue: amLODIPine (Norvasc) 10 mg tablet; Take 1 tablet (10 mg) by mouth once daily.  -     Discontinue: losartan (Cozaar) 100 mg tablet; Take 1 tablet (100 mg) by mouth once daily.  -     triamterene-hydrochlorothiazid (Dyazide) 37.5-25 mg capsule; Take 1 capsule by mouth 2 times a week.  -     amLODIPine (Norvasc) 10 mg tablet; Take 1 tablet (10 mg) by mouth once daily.  -     " losartan (Cozaar) 100 mg tablet; Take 1 tablet (100 mg) by mouth once daily.  Allergy, initial encounter  -     fexofenadine (Allegra) 180 mg tablet; Take 1 tablet (180 mg) by mouth once daily.  Hypothyroidism, adult  -     Discontinue: levothyroxine (Synthroid, Levoxyl) 50 mcg tablet; Take 1 tablet (50 mcg) by mouth once daily.  -     levothyroxine (Synthroid, Levoxyl) 50 mcg tablet; Take 1 tablet (50 mcg) by mouth once daily.  Dyslipidemia  -     Discontinue: simvastatin (Zocor) 20 mg tablet; Take 1 tablet (20 mg) by mouth once daily in the evening.  -     simvastatin (Zocor) 20 mg tablet; Take 1 tablet (20 mg) by mouth once daily in the evening.    -Patient recent blood work reviewed  - Patient declined flu vaccine  - Mild chronic kidney disease no change underlying polycystic kidney disease no complaints no change in renal function continue monitoring  Avoid any NSAIDs  -Hypertension controlled avoid salt continue with weight loss low-carb diet  -Mild cough due to allergies may use Allegra as needed follow-up if no improvement  - Status post bilateral blepharoplasty doing well vision improved doing well  - Continue on-Erectile dysfunction May use sildenafil as recommended new prescription provided today 30 tablets for 90-day supply  - Patient underwent 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

## 2024-11-20 ENCOUNTER — APPOINTMENT (OUTPATIENT)
Dept: SLEEP MEDICINE | Facility: CLINIC | Age: 79
End: 2024-11-20
Payer: COMMERCIAL

## 2024-12-03 DIAGNOSIS — N52.9 ERECTILE DYSFUNCTION, UNSPECIFIED ERECTILE DYSFUNCTION TYPE: ICD-10-CM

## 2024-12-03 RX ORDER — SILDENAFIL 100 MG/1
100 TABLET, FILM COATED ORAL AS NEEDED
Qty: 30 TABLET | Refills: 1 | Status: SHIPPED | OUTPATIENT
Start: 2024-12-03 | End: 2025-03-03

## 2024-12-18 ENCOUNTER — APPOINTMENT (OUTPATIENT)
Dept: SLEEP MEDICINE | Facility: CLINIC | Age: 79
End: 2024-12-18
Payer: COMMERCIAL

## 2024-12-18 VITALS
HEART RATE: 61 BPM | OXYGEN SATURATION: 96 % | BODY MASS INDEX: 28.28 KG/M2 | WEIGHT: 186 LBS | SYSTOLIC BLOOD PRESSURE: 142 MMHG | DIASTOLIC BLOOD PRESSURE: 72 MMHG

## 2024-12-18 DIAGNOSIS — G47.33 OBSTRUCTIVE SLEEP APNEA, ADULT: Primary | ICD-10-CM

## 2024-12-18 PROCEDURE — 3077F SYST BP >= 140 MM HG: CPT | Performed by: PSYCHIATRY & NEUROLOGY

## 2024-12-18 PROCEDURE — 1160F RVW MEDS BY RX/DR IN RCRD: CPT | Performed by: PSYCHIATRY & NEUROLOGY

## 2024-12-18 PROCEDURE — 3078F DIAST BP <80 MM HG: CPT | Performed by: PSYCHIATRY & NEUROLOGY

## 2024-12-18 PROCEDURE — 1159F MED LIST DOCD IN RCRD: CPT | Performed by: PSYCHIATRY & NEUROLOGY

## 2024-12-18 PROCEDURE — 1036F TOBACCO NON-USER: CPT | Performed by: PSYCHIATRY & NEUROLOGY

## 2024-12-18 PROCEDURE — G2211 COMPLEX E/M VISIT ADD ON: HCPCS | Performed by: PSYCHIATRY & NEUROLOGY

## 2024-12-18 PROCEDURE — 99213 OFFICE O/P EST LOW 20 MIN: CPT | Performed by: PSYCHIATRY & NEUROLOGY

## 2024-12-18 NOTE — PROGRESS NOTES
Patient: Lonny Reece    [id number]  : 1945 -- AGE 79 y.o.    Provider: Kelvin Washington MD     [facility]   Service Date: 2024              [facility]  Follow-up Note          HPI: Lonny Reece is a 79 y.o. male with severe FLORENTINO on CPAP. He is here today for a follow up visit.     He is here with his wife.    He has been using CPAP nightly. Tolerates it very well. Falls asleep easily with it. Wakes up feeling refreshed. No daytime sleepiness.    He has not had new supplies in the last 2 years since starting on CPAP.     He has been told by his DME company ([facility] in [place]) that he would have to sign something saying that he would have to pay for whatever Medicare and AARP do not cover, and historically they have had full coverage. He is not happy with the DME and would like to switch to another DME company.      PAP DEVICE   Setup date: 10/11/2022  Type: CPAP  Settin-20 cm H2O, EPR 3  Finding benefit: yes  MASK  Type: F20 ffm  Fit: good, but head straps are getting looser  Last changed: recent - he had extras    Patient is keeping the equipment clean.    Prior Sleep studies:   -PSG 8/10/15: weight 83 kg, BMI 27.83. Severe FLORENTINO - AHI 32/h, REM AHI 36/h, supine AHI 49.5/h. SpO2 wes 83%.   -CPAP titration 2016: tested CPAP 4-12 cm H2O. No optimal pressure found due to central and obstructive events at all pressures, and excessive mask leak was noted that increased at higher pressures.   REVIEW OF MACHINE DOWNLOAD:         Patient Active Problem List   Diagnosis    1st MTP arthritis    Arthritis    Erectile dysfunction    Hypertension    Hypothyroidism, adult    Low-density-lipoid-type (LDL) hyperlipoproteinemia    MCI (mild cognitive impairment)    Obstructive sleep apnea, adult    Vitamin D deficiency    Unilateral post-traumatic osteoarthritis of first carpometacarpal joint, right hand    Post-traumatic osteoarthritis, right hand     Overweight (BMI 25.0-29.9)     Past Medical History:   Diagnosis Date    Allergic contact dermatitis due to plants, except food 12/02/2020    Poison ivy    Allergic contact dermatitis due to plants, except food 10/12/2015    Contact dermatitis due to poison ivy    Body mass index (BMI) 27.0-27.9, adult 06/02/2020    BMI 27.0-27.9,adult    Body mass index (BMI) 29.0-29.9, adult 09/15/2021    Body mass index (BMI) of 29.0 to 29.9 in adult    Encounter for screening for malignant neoplasm of prostate 12/22/2014    Encounter for prostate cancer screening    Other amnesia 12/19/2013    Memory loss    Personal history of diseases of the skin and subcutaneous tissue     History of psoriasis    Personal history of other (healed) physical injury and trauma 03/31/2016    History of insect bite    Personal history of other diseases of male genital organs 11/21/2017    History of erectile dysfunction    Personal history of other diseases of the digestive system 05/29/2019    History of umbilical hernia    Personal history of other diseases of the musculoskeletal system and connective tissue 04/16/2014    History of low back pain    Personal history of other diseases of the nervous system and sense organs 02/06/2014    History of sciatica    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 90 tablet 1    aspirin 81 mg EC tablet Take 1 tablet (81 mg) by mouth 1 time.      cholecalciferol, vitamin D3, (VITAMIN D3 ORAL)  Vitamin D3      fexofenadine (Allegra) 180 mg tablet Take 1 tablet (180 mg) by mouth once daily. 30 tablet 5    levothyroxine (Synthroid, Levoxyl) 50 mcg tablet Take 1 tablet (50 mcg) by mouth once daily. 90 tablet 1    losartan (Cozaar) 100 mg tablet Take 1 tablet (100 mg) by mouth once daily. 90 tablet 1    multivitamin with minerals (multivitamin-iron-folic acid) tablet Take 1 tablet by mouth once daily.      NON FORMULARY prevagen      sildenafil (Viagra) 100 mg tablet Take 1 tablet (100 mg) by mouth if needed for erectile dysfunction. 30 tablet 1    simvastatin (Zocor) 20 mg tablet Take 1 tablet (20 mg) by mouth once daily in the evening. 90 tablet 1    triamterene-hydrochlorothiazid (Dyazide) 37.5-25 mg capsule Take 1 capsule by mouth 2 times a week. 26 capsule 1     No current facility-administered medications on file prior to visit.       PHYSICAL EXAMINATION:   Vitals:    12/18/24 1557   BP: 142/72   BP Location: Left arm   Patient Position: Sitting   BP Cuff Size: Adult   Pulse: 61   SpO2: 96%   Weight: 84.4 kg (186 lb)     Body mass index is 28.28 kg/m².  General: Awake. Alert. Comfortable. No apparent distress.   Speech: Normal.  Comprehension: Normal.  Mood: Stable.  Affect: Appropriate.  Pul:         Normal respiratory effort.   Abd:        increased central adiposity  Neuro: Alert, well-oriented. Cranial nerves II-XII grossly normal and symmetric.  Moves all limbs symmetrically with no evidence of significant focal weakness. No abnormal movements noted. Normal gait      ASSESSMENT AND PLAN: Mr. Lonny Reece is a 79 y.o. male with severe FLORENTINO doing great on CPAP. Patient's sleep apnea is under very good control with CPAP, he is deriving significant subjective benefit from treatment, his compliance is excellent, and mask leak is well controlled overall. The only issue is that he is having an issue with his supply company  not providing supplies.  We will switch him to another DME  company.    #FLORENTINO  -continue nightly CPAP at current settings (12-20 cm H2O, EPR 3)  -Rx to [facility] to transfer care to them for supplies    All of the above was discussed with the patient in detail. He voiced an understanding of the above and was agreeable to proceed further as advised.     20 minutes were spent with the patient plus time spent reviewing the chart, updating the chart as needed, and documenting.     FOLLOW UP: Dec 1, 2025 at 430 pm

## 2025-01-15 ENCOUNTER — APPOINTMENT (OUTPATIENT)
Dept: PODIATRY | Facility: CLINIC | Age: 80
End: 2025-01-15
Payer: MEDICARE

## 2025-01-15 DIAGNOSIS — M77.41 METATARSALGIA OF RIGHT FOOT: ICD-10-CM

## 2025-01-15 DIAGNOSIS — M79.671 PAIN IN BOTH FEET: Primary | ICD-10-CM

## 2025-01-15 DIAGNOSIS — M79.672 PAIN IN BOTH FEET: Primary | ICD-10-CM

## 2025-01-15 PROCEDURE — 1160F RVW MEDS BY RX/DR IN RCRD: CPT | Performed by: PODIATRIST

## 2025-01-15 PROCEDURE — 99213 OFFICE O/P EST LOW 20 MIN: CPT | Performed by: PODIATRIST

## 2025-01-15 PROCEDURE — G2211 COMPLEX E/M VISIT ADD ON: HCPCS | Performed by: PODIATRIST

## 2025-01-15 PROCEDURE — 1036F TOBACCO NON-USER: CPT | Performed by: PODIATRIST

## 2025-01-15 PROCEDURE — 1159F MED LIST DOCD IN RCRD: CPT | Performed by: PODIATRIST

## 2025-01-15 NOTE — PROGRESS NOTES
History of Present Illness:   Patient states they are here for foot exam   Concern of right foot pain  Tender when walking  In 1 specific area  No trauma noted  No other pedal concerns    Past Medical History  Past Medical History:   Diagnosis Date    Allergic contact dermatitis due to plants, except food 12/02/2020    Poison ivy    Allergic contact dermatitis due to plants, except food 10/12/2015    Contact dermatitis due to poison ivy    Body mass index (BMI) 27.0-27.9, adult 06/02/2020    BMI 27.0-27.9,adult    Body mass index (BMI) 29.0-29.9, adult 09/15/2021    Body mass index (BMI) of 29.0 to 29.9 in adult    Encounter for screening for malignant neoplasm of prostate 12/22/2014    Encounter for prostate cancer screening    Other amnesia 12/19/2013    Memory loss    Personal history of diseases of the skin and subcutaneous tissue     History of psoriasis    Personal history of other (healed) physical injury and trauma 03/31/2016    History of insect bite    Personal history of other diseases of male genital organs 11/21/2017    History of erectile dysfunction    Personal history of other diseases of the digestive system 05/29/2019    History of umbilical hernia    Personal history of other diseases of the musculoskeletal system and connective tissue 04/16/2014    History of low back pain    Personal history of other diseases of the nervous system and sense organs 02/06/2014    History of sciatica    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    Review Of Systems:  GENERAL: No weight loss, malaise or fevers.  HEENT: Negative for frequent or significant headaches,   RESPIRATORY: Negative for cough, wheezing or shortness of breath.  CARDIOVASCULAR: Negative for chest  pain, leg swelling or palpitations.    Examination of Both Lower Extremities:   Objective:   Vasc: DP and PT pulses are palpable bilateral.  CFT is less than 3 seconds bilateral.  Skin temperature is warm to cool proximal to distal bilateral.  No hair growth noted. Varicosities noted    Neuro: Vibratory, light touch and proprioception are intact bilateral.      Derm: Nails 1-5 bilateral are intact thick and discolored.      Ortho: Muscle strength is 5/5 for all pedal groups tested.  1st MTPJ with dc Rom. No edema, erythema or ecchymosis noted.   R plantar 2nd met pain with palpation. No edema, erythema or ecchymosis noted. Plantar fat pad atrophy noted  1. Pain in both feet        2. Metatarsalgia of right foot            Patient exam and eval  Discussed wear and tear of joints  Discussed fat pad atrophy  Discussed wearing stiff shoes with thick sole  Minimize walking with no shoes on   FU prn  Patient was in agreement to this plan. All questions answered.      Malaika Hammonds DPM  [phone]  [facility]  Fax: [phone]

## 2025-04-22 ENCOUNTER — APPOINTMENT (OUTPATIENT)
Dept: ORTHOPEDIC SURGERY | Facility: CLINIC | Age: 80
End: 2025-04-22
Payer: COMMERCIAL

## 2025-04-28 ENCOUNTER — APPOINTMENT (OUTPATIENT)
Dept: PRIMARY CARE | Facility: CLINIC | Age: 80
End: 2025-04-28

## 2025-04-28 VITALS
SYSTOLIC BLOOD PRESSURE: 122 MMHG | DIASTOLIC BLOOD PRESSURE: 60 MMHG | HEIGHT: 68 IN | BODY MASS INDEX: 28.89 KG/M2 | OXYGEN SATURATION: 97 % | HEART RATE: 57 BPM | TEMPERATURE: 97.6 F | WEIGHT: 190.6 LBS

## 2025-04-28 DIAGNOSIS — N52.9 ERECTILE DYSFUNCTION, UNSPECIFIED ERECTILE DYSFUNCTION TYPE: ICD-10-CM

## 2025-04-28 DIAGNOSIS — E78.00 HYPERCHOLESTEREMIA: ICD-10-CM

## 2025-04-28 DIAGNOSIS — E53.8 VITAMIN B12 DEFICIENCY: ICD-10-CM

## 2025-04-28 DIAGNOSIS — E03.9 HYPOTHYROIDISM, ADULT: ICD-10-CM

## 2025-04-28 DIAGNOSIS — R73.09 ABNORMAL BLOOD SUGAR: ICD-10-CM

## 2025-04-28 DIAGNOSIS — R53.83 OTHER FATIGUE: ICD-10-CM

## 2025-04-28 DIAGNOSIS — E78.5 DYSLIPIDEMIA: ICD-10-CM

## 2025-04-28 DIAGNOSIS — N18.31 STAGE 3A CHRONIC KIDNEY DISEASE (MULTI): ICD-10-CM

## 2025-04-28 DIAGNOSIS — Z00.00 ROUTINE GENERAL MEDICAL EXAMINATION AT HEALTH CARE FACILITY: Primary | ICD-10-CM

## 2025-04-28 DIAGNOSIS — E55.9 VITAMIN D DEFICIENCY, UNSPECIFIED: ICD-10-CM

## 2025-04-28 DIAGNOSIS — Z79.899 HIGH RISK MEDICATION USE: ICD-10-CM

## 2025-04-28 DIAGNOSIS — I10 HYPERTENSION, UNSPECIFIED TYPE: ICD-10-CM

## 2025-04-28 PROCEDURE — G2211 COMPLEX E/M VISIT ADD ON: HCPCS | Performed by: INTERNAL MEDICINE

## 2025-04-28 PROCEDURE — 1036F TOBACCO NON-USER: CPT | Performed by: INTERNAL MEDICINE

## 2025-04-28 PROCEDURE — 3074F SYST BP LT 130 MM HG: CPT | Performed by: INTERNAL MEDICINE

## 2025-04-28 PROCEDURE — 99214 OFFICE O/P EST MOD 30 MIN: CPT | Performed by: INTERNAL MEDICINE

## 2025-04-28 PROCEDURE — 1124F ACP DISCUSS-NO DSCNMKR DOCD: CPT | Performed by: INTERNAL MEDICINE

## 2025-04-28 PROCEDURE — 3078F DIAST BP <80 MM HG: CPT | Performed by: INTERNAL MEDICINE

## 2025-04-28 PROCEDURE — 1160F RVW MEDS BY RX/DR IN RCRD: CPT | Performed by: INTERNAL MEDICINE

## 2025-04-28 PROCEDURE — G0439 PPPS, SUBSEQ VISIT: HCPCS | Performed by: INTERNAL MEDICINE

## 2025-04-28 PROCEDURE — 1159F MED LIST DOCD IN RCRD: CPT | Performed by: INTERNAL MEDICINE

## 2025-04-28 PROCEDURE — 1170F FXNL STATUS ASSESSED: CPT | Performed by: INTERNAL MEDICINE

## 2025-04-28 RX ORDER — SILDENAFIL 100 MG/1
100 TABLET, FILM COATED ORAL AS NEEDED
Qty: 30 TABLET | Refills: 1 | Status: SHIPPED | OUTPATIENT
Start: 2025-04-28 | End: 2025-07-27

## 2025-04-28 RX ORDER — LEVOTHYROXINE SODIUM 50 UG/1
50 TABLET ORAL DAILY
Qty: 90 TABLET | Refills: 1 | Status: SHIPPED | OUTPATIENT
Start: 2025-04-28

## 2025-04-28 RX ORDER — SIMVASTATIN 20 MG/1
20 TABLET, FILM COATED ORAL EVERY EVENING
Qty: 90 TABLET | Refills: 1 | Status: SHIPPED | OUTPATIENT
Start: 2025-04-28

## 2025-04-28 RX ORDER — AMLODIPINE BESYLATE 10 MG/1
10 TABLET ORAL DAILY
Qty: 90 TABLET | Refills: 1 | Status: SHIPPED | OUTPATIENT
Start: 2025-04-28 | End: 2025-04-30

## 2025-04-28 RX ORDER — TRIAMTERENE AND HYDROCHLOROTHIAZIDE 37.5; 25 MG/1; MG/1
1 CAPSULE ORAL 2 TIMES WEEKLY
Qty: 26 CAPSULE | Refills: 1 | Status: SHIPPED | OUTPATIENT
Start: 2025-04-28

## 2025-04-28 RX ORDER — LOSARTAN POTASSIUM 100 MG/1
100 TABLET ORAL DAILY
Qty: 90 TABLET | Refills: 1 | Status: SHIPPED | OUTPATIENT
Start: 2025-04-28

## 2025-04-28 ASSESSMENT — ACTIVITIES OF DAILY LIVING (ADL)
DOING_HOUSEWORK: INDEPENDENT
BATHING: INDEPENDENT
TAKING_MEDICATION: INDEPENDENT
MANAGING_FINANCES: INDEPENDENT
GROCERY_SHOPPING: INDEPENDENT
DRESSING: INDEPENDENT

## 2025-04-28 ASSESSMENT — ENCOUNTER SYMPTOMS
COUGH: 0
HEADACHES: 0
BRUISES/BLEEDS EASILY: 0
ABDOMINAL PAIN: 0
SORE THROAT: 0
PALPITATIONS: 0
FATIGUE: 0
WHEEZING: 0
DIARRHEA: 0
ARTHRALGIAS: 0
DIZZINESS: 0
SINUS PAIN: 0
FEVER: 0
UNEXPECTED WEIGHT CHANGE: 0
BLOOD IN STOOL: 0
DIFFICULTY URINATING: 0

## 2025-04-28 NOTE — PROGRESS NOTES
"Subjective   Patient ID: Lonny Reece is a 79 y.o. male who presents for Medicare Annual Wellness Visit Subsequent.    HPI       Review of Systems    Objective   No results found for: \"HGBA1C\"   /60   Pulse 57   Temp 36.4 °C (97.6 °F)   Ht 1.727 m (5' 8\")   Wt 86.5 kg (190 lb 9.6 oz)   SpO2 97%   BMI 28.98 kg/m²     Physical Exam    Assessment/Plan   Lonny was seen today for medicare annual wellness visit subsequent.  Diagnoses and all orders for this visit:  Hypertension, unspecified type  Hypothyroidism, adult  Dyslipidemia  Erectile dysfunction, unspecified erectile dysfunction type     "

## 2025-04-28 NOTE — PROGRESS NOTES
Subjective   Reason for Visit: Lonny Reece is an 79 y.o. male here for a Medicare Wellness visit.     Past Medical, Surgical, and Family History reviewed and updated in chart.    Reviewed all medications by prescribing practitioner or clinical pharmacist (such as prescriptions, OTCs, herbal therapies and supplements) and documented in the medical record.      Annual Medicare physical  - Patient comes about vaccinations patient declined flu pneumonia and tetanus vaccine Shingrix vaccine ordered.  Benefit  - Screening for colon cancer not needed because of patient age doing well previously negative follow-up closely with lab results  - Screen for depression negative  - Advanced directive reviewed  -Counseled about diet controlled exercise sleep hygiene    Follow-up  - Hypercholesterolemia continue on simvastatin as recommended continue low-fat diet  - - Mild chronic kidney disease no change underlying polycystic kidney disease no complaints no change in renal function continue monitoring  Avoid any NSAIDs  -Hypertension controlled avoid salt continue with weight loss low-carb diet  - Continue on-Erectile dysfunction May use sildenafil as recommended new prescription provided today 30 tablets for 90-day supply  - Patient underwent 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   HENT:  Negative for congestion, ear discharge, ear pain, mouth sores, sinus pain and sore throat.    Eyes:  Negative for visual disturbance.   Respiratory:  Negative for cough and wheezing.    Cardiovascular:   "Negative for chest pain, palpitations and leg swelling.   Gastrointestinal:  Negative for abdominal pain, blood in stool and diarrhea.   Genitourinary:  Negative for difficulty urinating.   Musculoskeletal:  Negative for arthralgias.   Skin:  Negative for rash.   Neurological:  Negative for dizziness and headaches.   Hematological:  Does not bruise/bleed easily.   Psychiatric/Behavioral:  Negative for behavioral problems.    All other systems reviewed and are negative.      Objective   Vitals:  /60   Pulse 57   Temp 36.4 °C (97.6 °F)   Ht 1.727 m (5' 8\")   Wt 86.5 kg (190 lb 9.6 oz)   SpO2 97%   BMI 28.98 kg/m²     Lab Results   Component Value Date    WBC 12.0 (H) 05/16/2024    HGB 13.9 05/16/2024    HCT 41.6 05/16/2024     05/16/2024    CHOL 160 05/16/2024    TRIG 159 (H) 05/16/2024    HDL 41.3 05/16/2024    ALT 16 05/16/2024    AST 16 05/16/2024     05/16/2024    K 4.4 05/16/2024     05/16/2024    CREATININE 1.57 (H) 05/16/2024    BUN 32 (H) 05/16/2024    CO2 24 05/16/2024    TSH 1.79 05/16/2024     par   Physical Exam  Vitals and nursing note reviewed.   Constitutional:       Appearance: Normal appearance.   HENT:      Head: Normocephalic.      Nose: Nose normal.   Eyes:      Conjunctiva/sclera: Conjunctivae normal.      Pupils: Pupils are equal, round, and reactive to light.   Cardiovascular:      Rate and Rhythm: Regular rhythm.   Pulmonary:      Effort: Pulmonary effort is normal.      Breath sounds: Normal breath sounds.   Abdominal:      General: Abdomen is flat.      Palpations: Abdomen is soft.   Musculoskeletal:      Cervical back: Neck supple.   Skin:     General: Skin is warm.   Neurological:      General: No focal deficit present.      Mental Status: He is oriented to person, place, and time.   Psychiatric:         Mood and Affect: Mood normal.         Assessment & Plan  Hypertension, unspecified type    Orders:    amLODIPine (Norvasc) 10 mg tablet; Take 1 tablet (10 mg) " by mouth once daily.    losartan (Cozaar) 100 mg tablet; Take 1 tablet (100 mg) by mouth once daily.    triamterene-hydrochlorothiazid (Dyazide) 37.5-25 mg capsule; Take 1 capsule by mouth 2 times a week.    Comprehensive Metabolic Panel; Future    Hypothyroidism, adult    Orders:    levothyroxine (Synthroid, Levoxyl) 50 mcg tablet; Take 1 tablet (50 mcg) by mouth once daily.    Dyslipidemia    Orders:    simvastatin (Zocor) 20 mg tablet; Take 1 tablet (20 mg) by mouth once daily in the evening.    Erectile dysfunction, unspecified erectile dysfunction type    Orders:    sildenafil (Viagra) 100 mg tablet; Take 1 tablet (100 mg) by mouth if needed for erectile dysfunction.    Routine general medical examination at health care facility    Orders:    1 Year Follow Up In Primary Care - Wellness Exam; Future    High risk medication use    Orders:    CBC and Auto Differential; Future    Abnormal blood sugar    Orders:    Hemoglobin A1C; Future    Hypercholesteremia    Orders:    Lipid Panel; Future    Vitamin B12 deficiency    Orders:    Vitamin B12; Future    Vitamin D deficiency, unspecified    Orders:    Vitamin D 25-Hydroxy,Total (for eval of Vitamin D levels); Future    Other fatigue    Orders:    TSH with reflex to Free T4 if abnormal; Future    Stage 3a chronic kidney disease (Multi)    Orders:    Albumin-Creatinine Ratio, Urine Random; Future     Annual Medicare physical  - Patient comes about vaccinations patient declined flu pneumonia and tetanus vaccine Shingrix vaccine ordered.  Benefit  - Screening for colon cancer not needed because of patient age doing well previously negative follow-up closely with lab results  - Screen for depression negative  - Advanced directive reviewed  -Counseled about diet controlled exercise sleep hygiene    Follow-up  - Hypercholesterolemia continue on simvastatin as recommended continue low-fat diet  - - Mild chronic kidney disease no change underlying polycystic kidney disease  no complaints no change in renal function continue monitoring  Avoid any NSAIDs  -Hypertension controlled avoid salt continue with weight loss low-carb diet  - Continue on-Erectile dysfunction May use sildenafil as recommended new prescription provided today 30 tablets for 90-day supply  - Patient underwent 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

## 2025-04-28 NOTE — ASSESSMENT & PLAN NOTE
Orders:    amLODIPine (Norvasc) 10 mg tablet; Take 1 tablet (10 mg) by mouth once daily.    losartan (Cozaar) 100 mg tablet; Take 1 tablet (100 mg) by mouth once daily.    triamterene-hydrochlorothiazid (Dyazide) 37.5-25 mg capsule; Take 1 capsule by mouth 2 times a week.    Comprehensive Metabolic Panel; Future

## 2025-04-29 ENCOUNTER — OFFICE VISIT (OUTPATIENT)
Dept: ORTHOPEDIC SURGERY | Facility: CLINIC | Age: 80
End: 2025-04-29
Payer: COMMERCIAL

## 2025-04-29 DIAGNOSIS — M18.31 POST-TRAUMATIC OSTEOARTHRITIS OF FIRST CARPOMETACARPAL JOINT OF RIGHT HAND: Primary | ICD-10-CM

## 2025-04-29 DIAGNOSIS — I10 HYPERTENSION, UNSPECIFIED TYPE: ICD-10-CM

## 2025-04-29 PROCEDURE — 99213 OFFICE O/P EST LOW 20 MIN: CPT | Performed by: ORTHOPAEDIC SURGERY

## 2025-04-29 ASSESSMENT — ENCOUNTER SYMPTOMS
SHORTNESS OF BREATH: 0
FATIGUE: 0
BRUISES/BLEEDS EASILY: 0
ARTHRALGIAS: 1
FEVER: 0
CHILLS: 0
WHEEZING: 0

## 2025-04-29 ASSESSMENT — PAIN SCALES - GENERAL: PAINLEVEL_OUTOF10: 4

## 2025-04-29 ASSESSMENT — PAIN - FUNCTIONAL ASSESSMENT: PAIN_FUNCTIONAL_ASSESSMENT: 0-10

## 2025-04-29 NOTE — PROGRESS NOTES
Reason for Appointment  Chief Complaint   Patient presents with    Right Hand - Follow-up     History of Present Illness  Patient is a 79 y.o. male here today for follow-up evaluation of right thumb. This is a [facility] case. We last saw the patient on 9/17/24 and we discussed his stiffness in the thumb. Today he is at baseline. He does not wear the brace. He is being smart with activities. No recent falls or injuries. No other changes in past medical history, allergies, or medications.    Medical History[1]    Surgical History[2]    Medication Documentation Review Audit       Reviewed by Leann Dunaway MD (Physician) on 04/28/25 at 1341      Medication Order Taking? Sig Documenting Provider Last Dose Status   amLODIPine (Norvasc) 10 mg tablet [id number] Yes Take 1 tablet (10 mg) by mouth once daily. Leann Dunaway MD  Active   aspirin 81 mg EC tablet [id number] Yes Take 1 tablet (81 mg) by mouth 1 time. Historical Provider, MD  Active   cholecalciferol, vitamin D3, (VITAMIN D3 ORAL) [id number] Yes Vitamin D3 Historical Provider, MD  Active   fexofenadine (Allegra) 180 mg tablet [id number] Yes Take 1 tablet (180 mg) by mouth once daily. Leann Dunaway MD  Active   levothyroxine (Synthroid, Levoxyl) 50 mcg tablet [id number] Yes Take 1 tablet (50 mcg) by mouth once daily. Leann Dunaway MD  Active   losartan (Cozaar) 100 mg tablet [id number] Yes Take 1 tablet (100 mg) by mouth once daily. Leann Dunaway MD  Active   multivitamin with minerals (multivitamin-iron-folic acid) tablet [id number] Yes Take 1 tablet by mouth once daily. Historical Provider, MD  Active   NON FORMULARY [id number] Yes prevagen Historical Provider, MD  Active   sildenafil (Viagra) 100 mg tablet [id number]  Take 1 tablet (100 mg) by mouth if needed for erectile dysfunction. Leann Dunaway MD  Active   simvastatin (Zocor) 20 mg tablet [id number] Yes Take 1 tablet (20 mg) by mouth once daily in the evening. Leann Dunaway MD  Active    triamterene-hydrochlorothiazid (Dyazide) 37.5-25 mg capsule [id number] Yes Take 1 capsule by mouth 2 times a week. Leann Dunaway MD  Active                    RX Allergies[3]    Review of Systems   Constitutional:  Negative for chills, fatigue and fever.   Respiratory:  Negative for shortness of breath and wheezing.    Cardiovascular:  Negative for chest pain and leg swelling.   Musculoskeletal:  Positive for arthralgias.   Allergic/Immunologic: Negative for immunocompromised state.   Hematological:  Does not bruise/bleed easily.       Exam   Maintained good composite flexion. Decreased rom of thumb. Tenders at the cmc of the thumb and index finger. Good pulses and sensation.   Assessment   Right thumb arthritis     Plan     Follow up 6 months. From an ortho standpoint no surgical intervention is warranted at this point. We discussed occasional injections with flare ups.       I, Stacy Oliva, attest that this documentation has been prepared under the direction and in the presence of Palmer Schrader MD.   By signing below, I, Palmer Schrader MD, personally performed the services described in this documentation. All medical record entries made by the scribe were at my direction and in my presence. I have reviewed the chart and agree that the record reflects my personal performance and is accurate and complete.         [1]   Past Medical History:  Diagnosis Date    Allergic contact dermatitis due to plants, except food 12/02/2020    Poison ivy    Allergic contact dermatitis due to plants, except food 10/12/2015    Contact dermatitis due to poison ivy    Body mass index (BMI) 27.0-27.9, adult 06/02/2020    BMI 27.0-27.9,adult    Body mass index (BMI) 29.0-29.9, adult 09/15/2021    Body mass index (BMI) of 29.0 to 29.9 in adult    Encounter for screening for malignant neoplasm of prostate 12/22/2014    Encounter for prostate cancer screening    Other amnesia 12/19/2013    Memory loss    Personal history of diseases of  the skin and subcutaneous tissue     History of psoriasis    Personal history of other (healed) physical injury and trauma 03/31/2016    History of insect bite    Personal history of other diseases of male genital organs 11/21/2017    History of erectile dysfunction    Personal history of other diseases of the digestive system 05/29/2019    History of umbilical hernia    Personal history of other diseases of the musculoskeletal system and connective tissue 04/16/2014    History of low back pain    Personal history of other diseases of the nervous system and sense organs 02/06/2014    History of sciatica    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

## 2025-04-30 RX ORDER — AMLODIPINE BESYLATE 10 MG/1
10 TABLET ORAL DAILY
Qty: 90 TABLET | Refills: 1 | Status: SHIPPED | OUTPATIENT
Start: 2025-04-30

## 2025-05-20 LAB
25(OH)D3+25(OH)D2 SERPL-MCNC: 83 NG/ML (ref 30–100)
ALBUMIN SERPL-MCNC: 4.6 G/DL (ref 3.6–5.1)
ALBUMIN/CREAT UR: 13 MG/G CREAT
ALP SERPL-CCNC: 77 U/L (ref 35–144)
ALT SERPL-CCNC: 14 U/L (ref 9–46)
ANION GAP SERPL CALCULATED.4IONS-SCNC: 10 MMOL/L (CALC) (ref 7–17)
AST SERPL-CCNC: 19 U/L (ref 10–35)
BASOPHILS # BLD AUTO: 58 CELLS/UL (ref 0–200)
BASOPHILS NFR BLD AUTO: 0.9 %
BILIRUB SERPL-MCNC: 0.8 MG/DL (ref 0.2–1.2)
BUN SERPL-MCNC: 22 MG/DL (ref 7–25)
CALCIUM SERPL-MCNC: 9.2 MG/DL (ref 8.6–10.3)
CHLORIDE SERPL-SCNC: 108 MMOL/L (ref 98–110)
CHOLEST SERPL-MCNC: 141 MG/DL
CHOLEST/HDLC SERPL: 3.1 (CALC)
CO2 SERPL-SCNC: 23 MMOL/L (ref 20–32)
CREAT SERPL-MCNC: 1.7 MG/DL (ref 0.7–1.28)
CREAT UR-MCNC: 244 MG/DL (ref 20–320)
EGFRCR SERPLBLD CKD-EPI 2021: 41 ML/MIN/1.73M2
EOSINOPHIL # BLD AUTO: 358 CELLS/UL (ref 15–500)
EOSINOPHIL NFR BLD AUTO: 5.6 %
ERYTHROCYTE [DISTWIDTH] IN BLOOD BY AUTOMATED COUNT: 12 % (ref 11–15)
EST. AVERAGE GLUCOSE BLD GHB EST-MCNC: 117 MG/DL
EST. AVERAGE GLUCOSE BLD GHB EST-SCNC: 6.5 MMOL/L
GLUCOSE SERPL-MCNC: 100 MG/DL (ref 65–99)
HBA1C MFR BLD: 5.7 %
HCT VFR BLD AUTO: 41 % (ref 38.5–50)
HDLC SERPL-MCNC: 46 MG/DL
HGB BLD-MCNC: 13.4 G/DL (ref 13.2–17.1)
LDLC SERPL CALC-MCNC: 75 MG/DL (CALC)
LYMPHOCYTES # BLD AUTO: 1434 CELLS/UL (ref 850–3900)
LYMPHOCYTES NFR BLD AUTO: 22.4 %
MCH RBC QN AUTO: 30.8 PG (ref 27–33)
MCHC RBC AUTO-ENTMCNC: 32.7 G/DL (ref 32–36)
MCV RBC AUTO: 94.3 FL (ref 80–100)
MICROALBUMIN UR-MCNC: 3.2 MG/DL
MONOCYTES # BLD AUTO: 467 CELLS/UL (ref 200–950)
MONOCYTES NFR BLD AUTO: 7.3 %
NEUTROPHILS # BLD AUTO: 4083 CELLS/UL (ref 1500–7800)
NEUTROPHILS NFR BLD AUTO: 63.8 %
NONHDLC SERPL-MCNC: 95 MG/DL (CALC)
PLATELET # BLD AUTO: 221 THOUSAND/UL (ref 140–400)
PMV BLD REES-ECKER: 11 FL (ref 7.5–12.5)
POTASSIUM SERPL-SCNC: 4.4 MMOL/L (ref 3.5–5.3)
PROT SERPL-MCNC: 6.9 G/DL (ref 6.1–8.1)
RBC # BLD AUTO: 4.35 MILLION/UL (ref 4.2–5.8)
SODIUM SERPL-SCNC: 141 MMOL/L (ref 135–146)
TRIGL SERPL-MCNC: 115 MG/DL
TSH SERPL-ACNC: 2.13 MIU/L (ref 0.4–4.5)
VIT B12 SERPL-MCNC: 501 PG/ML (ref 200–1100)
WBC # BLD AUTO: 6.4 THOUSAND/UL (ref 3.8–10.8)

## 2025-07-28 ENCOUNTER — APPOINTMENT (OUTPATIENT)
Dept: PRIMARY CARE | Facility: CLINIC | Age: 80
End: 2025-07-28
Payer: MEDICARE

## 2025-07-28 VITALS
SYSTOLIC BLOOD PRESSURE: 120 MMHG | BODY MASS INDEX: 27.76 KG/M2 | OXYGEN SATURATION: 97 % | DIASTOLIC BLOOD PRESSURE: 60 MMHG | HEART RATE: 57 BPM | TEMPERATURE: 97.8 F | WEIGHT: 182.6 LBS

## 2025-07-28 DIAGNOSIS — E78.00 LOW-DENSITY-LIPOID-TYPE (LDL) HYPERLIPOPROTEINEMIA: ICD-10-CM

## 2025-07-28 DIAGNOSIS — E55.9 VITAMIN D DEFICIENCY: ICD-10-CM

## 2025-07-28 DIAGNOSIS — E03.9 HYPOTHYROIDISM, ADULT: Primary | ICD-10-CM

## 2025-07-28 DIAGNOSIS — I10 HYPERTENSION, UNSPECIFIED TYPE: ICD-10-CM

## 2025-07-28 DIAGNOSIS — E78.5 DYSLIPIDEMIA: ICD-10-CM

## 2025-07-28 DIAGNOSIS — G47.33 OBSTRUCTIVE SLEEP APNEA, ADULT: ICD-10-CM

## 2025-07-28 PROCEDURE — G2211 COMPLEX E/M VISIT ADD ON: HCPCS | Performed by: INTERNAL MEDICINE

## 2025-07-28 PROCEDURE — 1159F MED LIST DOCD IN RCRD: CPT | Performed by: INTERNAL MEDICINE

## 2025-07-28 PROCEDURE — 1036F TOBACCO NON-USER: CPT | Performed by: INTERNAL MEDICINE

## 2025-07-28 PROCEDURE — 3074F SYST BP LT 130 MM HG: CPT | Performed by: INTERNAL MEDICINE

## 2025-07-28 PROCEDURE — 3078F DIAST BP <80 MM HG: CPT | Performed by: INTERNAL MEDICINE

## 2025-07-28 PROCEDURE — 1160F RVW MEDS BY RX/DR IN RCRD: CPT | Performed by: INTERNAL MEDICINE

## 2025-07-28 PROCEDURE — 99214 OFFICE O/P EST MOD 30 MIN: CPT | Performed by: INTERNAL MEDICINE

## 2025-07-28 RX ORDER — AMLODIPINE BESYLATE 10 MG/1
10 TABLET ORAL DAILY
Qty: 90 TABLET | Refills: 1 | Status: SHIPPED | OUTPATIENT
Start: 2025-07-28

## 2025-07-28 RX ORDER — SIMVASTATIN 20 MG/1
20 TABLET, FILM COATED ORAL EVERY EVENING
Qty: 90 TABLET | Refills: 1 | Status: SHIPPED | OUTPATIENT
Start: 2025-07-28

## 2025-07-28 RX ORDER — LEVOTHYROXINE SODIUM 50 UG/1
50 TABLET ORAL DAILY
Qty: 90 TABLET | Refills: 1 | Status: SHIPPED | OUTPATIENT
Start: 2025-07-28

## 2025-07-28 ASSESSMENT — ENCOUNTER SYMPTOMS
DIFFICULTY URINATING: 0
FATIGUE: 0
UNEXPECTED WEIGHT CHANGE: 0
PALPITATIONS: 0
SORE THROAT: 0
COUGH: 0
HEADACHES: 0
SINUS PAIN: 0
ARTHRALGIAS: 0
HYPERTENSION: 1
DIARRHEA: 0
DIZZINESS: 0
BRUISES/BLEEDS EASILY: 0
WHEEZING: 0
BLOOD IN STOOL: 0
FEVER: 0
ABDOMINAL PAIN: 0

## 2025-07-28 NOTE — PROGRESS NOTES
Subjective   Patient ID: Lonny Reece is a 79 y.o. male who presents for Hypothyroidism, Hyperlipidemia, Hypertension, and Results (BW).    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   Hypertension  Pertinent negatives include no chest pain, headaches or palpitations.          Review of Systems   Constitutional:  Negative for fatigue, fever and unexpected weight change.   HENT:  Negative for congestion, ear discharge, ear pain, mouth sores, sinus pain and sore throat.    Eyes:  Negative for visual disturbance.   Respiratory:  Negative for cough and wheezing.    Cardiovascular:  Negative for chest pain, palpitations and leg swelling.   Gastrointestinal:  Negative for abdominal pain, blood in stool and diarrhea.   Genitourinary:  Negative for difficulty urinating.   Musculoskeletal:  Negative for arthralgias.   Skin:  Negative for rash.   Neurological:  Negative for dizziness and headaches.   Hematological:  Does not bruise/bleed easily.   Psychiatric/Behavioral:  Negative for behavioral problems.    All other systems reviewed and are negative.      Objective   Lab Results   Component Value Date     HGBA1C 5.7 (H) 05/19/2025      /60   Pulse 57   Temp 36.6 °C (97.8 °F)   Wt 82.8 kg (182 lb 9.6 oz)   SpO2 97%   BMI 27.76 kg/m²     Physical Exam  Vitals and nursing note reviewed.   Constitutional:       Appearance: Normal appearance.   HENT:      Head: Normocephalic.      Nose: Nose normal.     Eyes:      Conjunctiva/sclera: Conjunctivae normal.      Pupils: Pupils are equal, round, and reactive to light.       Cardiovascular:      Rate and Rhythm: Regular rhythm.   Pulmonary:      Effort: Pulmonary effort is normal.      Breath sounds: Normal breath sounds.   Abdominal:      General: Abdomen is flat.      Palpations: Abdomen is soft.     Musculoskeletal:      Cervical back: Neck supple.     Skin:     General: Skin is warm.     Neurological:      General: No focal deficit present.      Mental Status: He is oriented to person, place, and time.     Psychiatric:         Mood and Affect: Mood normal.       Lab Results   Component Value Date    WBC 6.4 05/19/2025    HGB 13.4 05/19/2025    HCT 41.0 05/19/2025     05/19/2025    CHOL 141 05/19/2025    TRIG 115 05/19/2025    HDL 46 05/19/2025    ALT 14 05/19/2025    AST 19 05/19/2025     05/19/2025    K 4.4 05/19/2025     05/19/2025    CREATININE 1.70 (H) 05/19/2025    BUN 22 05/19/2025    CO2 23 05/19/2025    TSH 2.13 05/19/2025    HGBA1C 5.7 (H) 05/19/2025     par   Assessment/Plan   Lonny was seen today for hypothyroidism, hyperlipidemia, hypertension and results.  Diagnoses and all orders for this visit:  Hypothyroidism, adult (Primary)  -     levothyroxine (Synthroid, Levoxyl) 50 mcg tablet; Take 1 tablet (50 mcg) by mouth once daily.  Hypertension, unspecified type  -     amLODIPine (Norvasc) 10 mg tablet; Take 1 tablet (10 mg) by mouth once daily.  Vitamin D deficiency  Obstructive sleep apnea, adult  Low-density-lipoid-type (LDL) hyperlipoproteinemia  Dyslipidemia  -     simvastatin (Zocor) 20 mg tablet; Take 1 tablet (20 mg) by  mouth once daily in the evening.  Other orders  -     Follow Up In Primary Care - Established  -     Follow Up In Primary Care - Established; Future    Recent blood work and the plan of care reviewed  Doing well no complaints  - Hypercholesterolemia continue on simvastatin as recommended continue low-fat diet  - - Mild chronic kidney disease no change underlying polycystic kidney disease no complaints no change in renal function continue monitoring  Avoid any NSAIDs  -Hypertension controlled avoid salt continue with weight loss low-carb diet  - Continue on-Erectile dysfunction May use sildenafil as recommended new prescription provided today 30 tablets for 90-day supply  - Patient underwent 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

## 2025-12-01 ENCOUNTER — APPOINTMENT (OUTPATIENT)
Dept: SLEEP MEDICINE | Facility: CLINIC | Age: 80
End: 2025-12-01
Payer: MEDICARE

## 2025-12-15 ENCOUNTER — APPOINTMENT (OUTPATIENT)
Dept: PRIMARY CARE | Facility: CLINIC | Age: 80
End: 2025-12-15
Payer: COMMERCIAL